# Patient Record
Sex: FEMALE | Race: WHITE
[De-identification: names, ages, dates, MRNs, and addresses within clinical notes are randomized per-mention and may not be internally consistent; named-entity substitution may affect disease eponyms.]

---

## 2018-03-19 NOTE — NUR
PATIENT HERE TODAY FOR PREADMISSION APPOINTMENT ACCOMPANIED BY HER 
DOLORES.  PATIENT IS SCHEDULED FOR A RIGHT TOTAL KNEE REPLACMENT ON 04/02/18.
SHE LIVES IN [place] AND WOULD LIKE PHYSICAL THERAPY TO BE SET UP AT [facility].  SHE REPORTS HAVING A RAMP TO GET INTO THE HOME AND NO STEPS INSIDE
THE HOME.  SHE HAS A TUB SHOWER COMBO THAT HER  HAS WORKED ON AND IT IS
A "WALK IN" SINCE HE CUT A HOLE IN THE SIDE OF THE TUB.  SHE HAS A SHOWER
CHAIR AND IS LOOKING AT GETTING A HAND HELD SHOWER HEAD.  SHE HAS A 4 WHEELED
WALKER THAT SHE WOULD LIKE TO USE IF SHE IS ABLE TO.  THIS INFORMATION WILL BE
SENT TO DR BUSTILLO OFFICE AND DE PLANNING FOR FURTHER FOLLOW UP.

## 2018-04-02 ENCOUNTER — HOSPITAL ENCOUNTER (INPATIENT)
Dept: HOSPITAL 46 - DS | Age: 78
LOS: 3 days | Discharge: HOME | DRG: 470 | End: 2018-04-05
Attending: SPECIALIST | Admitting: SPECIALIST
Payer: MEDICARE

## 2018-04-02 VITALS — HEIGHT: 64 IN | WEIGHT: 293 LBS | BODY MASS INDEX: 50.02 KG/M2

## 2018-04-02 DIAGNOSIS — M17.11: Primary | ICD-10-CM

## 2018-04-02 DIAGNOSIS — Z79.82: ICD-10-CM

## 2018-04-02 DIAGNOSIS — K21.9: ICD-10-CM

## 2018-04-02 DIAGNOSIS — I12.9: ICD-10-CM

## 2018-04-02 DIAGNOSIS — I73.9: ICD-10-CM

## 2018-04-02 DIAGNOSIS — G89.29: ICD-10-CM

## 2018-04-02 DIAGNOSIS — N18.3: ICD-10-CM

## 2018-04-02 PROCEDURE — C1776 JOINT DEVICE (IMPLANTABLE): HCPCS

## 2018-04-02 PROCEDURE — C1713 ANCHOR/SCREW BN/BN,TIS/BN: HCPCS

## 2018-04-02 PROCEDURE — G8978 MOBILITY CURRENT STATUS: HCPCS

## 2018-04-02 PROCEDURE — 0SRC0J9 REPLACEMENT OF RIGHT KNEE JOINT WITH SYNTHETIC SUBSTITUTE, CEMENTED, OPEN APPROACH: ICD-10-PCS | Performed by: SPECIALIST

## 2018-04-02 PROCEDURE — G8987 SELF CARE CURRENT STATUS: HCPCS

## 2018-04-02 PROCEDURE — G8979 MOBILITY GOAL STATUS: HCPCS

## 2018-04-02 NOTE — NUR
PT ALERT, ORIENTED AND SUPPORTED BY HER  BUCK. COUPLE TRAVELED EARLY
THIS MORNING FROM [place], SEEMED TO BE VERY THANKFUL FOR DR BUSTILLO. SPENT SOME
TIME HELPING THEM UNDERSTAND THE PROCESS FOR TODAY. PT REQUESTED PRAYER, WILL
FOLLOW AS NEEDED

## 2018-04-02 NOTE — NUR
PATIENT ARRIVED FROM PACU A+O IN PATIENT BED WITH RAINER SANCHEZ. PATIENT HAD RIGHT
TOTAL KNEE WITH  AND ARRIVES WITH STAPLES IN RIGHT KNEE FOR CLOSURE,
MEPILEX DRESSING, ACE WRAP, BILAT ANGELA HOSE, BILAT SCD'S, AND CRYO CUFF IN
PLACE. PATIENT IS ON 2L/NC AND DESATS TO THE 80'S OFF O2, BUT REMAINS IN THE
HIGH 90'S ON THE 2L/NC. 2OG IV RUNNING LR TO GRAVITY IN THE LEFT HAND. HEEL
PROTECTORS APPLIED TO BOTH FEET. LUNGS CLEAR BUT DEMINISHED. PATIENT HAS
SENSATION IN HER LEGS FROM THE SPINAL TO THE MID THIGH, BUT CAN MOVE BOTH
LOWER EXTREMETIES ON COMMAND. TOES ARE PINK AND COOL BILAT WITH CAP REFILL <3
SECONDS BILAT.

## 2018-04-02 NOTE — NUR
PATIENT RESTING COMFORTABLY IN BED, BREATHING IS EVEN AND UNLABORED. DENIES
PAIN, NO NAUSEA AT THIS TIME. NO NEEDS AT THIS TIME. CURRENTLY EATING JELLO,
O2 SATURATION % ON 2L O2 VIA NC, TITRATED TO 1L O2. WILL CONTINUE TO
MONITOR. ASSESSMENT DONE. CALL LIGHT WITHIN REACH.

## 2018-04-02 NOTE — NUR
PATIENT ASSISTED TO BEDSIDE COMODE WITH FWW. REQUIRED 3PA DUE TO PATIENT'S
WEAKNESS AND UNSTEADY GAIT. PATIENT THEN TRANSFERED BACK TO BED WITH 3PA/FWW,
NOW RESTING COMFORTABLY. PATIENT DENIES PAIN AT THIS TIME, STATES "I AM JUST
SHORT OF BREATH. I AM EXHAUSTED." APPEARS TO BE COMFORTABLY, BREATHING IS EVEN
AND NORMAL, RR IS 20. O2 SATURATION IS 95% ON 1L O2 VIA NC. PATIENT DENIES
NAUSEA AT THIS TIME. PATIENT WAS ABLE TO VOID. PATIENT DENIES FURTHER NEEDS AT
THIS TIME, ALL ORDERS IN PLACE, CALL LIGHT WITHIN REACH.

## 2018-04-02 NOTE — NUR
PATIENT ARRIVED FROM PACU AT 1255. STANDARD  DRESSING AND LOWER
EXTREMITY EQUIPMENT IN PLACE. 1310 PATIENT VOMITED AND WAS GIVEN 4MG IV
ZOFRAN. NAUSEA SEEMED TO SUBSIDE UNTIL 1355 A WHICH TIME PATIENT VOMITED AGAIN
AND WAS GIVEN 12.5MG IV PROMETHAZINE, WHICH RELIEVED THE N/V, BUT PUT THE
PATIENT TO SLEEP. WAKING PERIODICALLY UNTIL APPROXIMATELY 1630 WHEN PT GOT
PT UP
TO THE BED SIDE COMMODE. PATIIENT HAD SOME INCONTINENCE OF URINE ON THE BED
AND VOIDED SOME IN THE COMMODE. PATIENT GOT NAUSEATED AGAIN AT THIS TIME, AND
VOMITED AGAIN. PROMETHAZINE WAS DRAWN UP AGAIN, BUT NAUSEA SUBSIDED ONCE BACK
IN BED. PATIENT TRIED TO DRINK A LITTLE TEA AND EAT A LITTLE JELLO AT 1725 AND
NAUSEA RETURNED AND THE PROMETHAZINE 12.5 MG IV IN 25MLS NS DRAWN UP EARLIER
GIVEN AS BEFORE AND RELIEVING NAUSEA, BUT PUTTING PATIENT TO SLEEP AGAIN. SHE
IS RESTING QUIETLY AT THIS TIME. UNABLE TO TITRATE O2 AS OF YET.

## 2018-04-02 NOTE — NUR
SPOKE WITH PATIENT AND  IN ROOM.  PATIENT FEELING SLIGHTLY NAUSEATED
POST-OP BUT ABLE TO CONVERSE.  PATIENT LIVES IN HOME WITH RAMP, NO STAIRS
INSIDE.  HAS A MODIFIED TUB FOR LOW STEP IN.  HAS A SHOWER CHAIR AND HAND-HELD
SHOWER HEAD.  PATIENT HAS A FOUR WHEEL WALKER WITH BRAKES/SEAT IN ROOM.  SHE
IS AWARE SHE MIGHT NOT BE ABLE TO USE THAT FOR IMMEDIATE POST-OP.  SHE WANTS
TO CHECK WITH PHYSICAL THERAPY AND DR BUSTILLO.  PATIENT WANTS TO DO OUTPATIENT
THERAPY AT [facility].  SHE WILL HAVE HER FAMILY TO DRIVE HER
AND HELP HER AT HOME.  NO QUESTIONS AT THIS TIME.

## 2018-04-02 NOTE — NUR
04/02/18 1212 Pee Flowers A
BP CUFF BEING READJUSTED. PT AWAKE AND RESPONSIVE. DENIES NAUSEA OR
PAIN.

## 2018-04-02 NOTE — NUR
RECEIVED REPORT FROM RN. PATIENT IS RESTING IN BED, BREATHING IS EVEN AND
UNLABORED. O2 SATURATION % ON 2L O2 VIA NC. DENIES PAIN AT THIS TIME. NO
NEEDS. ALL ORDERS IN PLACE,  AT BEDSIDE.

## 2018-04-02 NOTE — NUR
PT MOVED FROM PACU TO M\S RM. RAINER MANDUJANO REQUESTED I NOT VISIT NOW-PT VOIDING.
HE IS GOING TO GET MORE MEDICATION. REQUESTED I COME BACK LATER. WILL CHECK
BACK LATER

## 2018-04-03 NOTE — NUR
ASSISTED PATIENT TO BEDSIDE COMODE WITH 2PA/FWW. TOLERATED WELL. NOW RESTING
COMFORTABLY IN BED, BREATHING IS EVEN AND UNLABORED. DENIES FURTHER NEEDS. AT
THIS TIME. PAIN IS 0/10. ALL ORDERS IN PLACE, CALL LIGHT WITHIN REACH.

## 2018-04-03 NOTE — NUR
PT IN ROOM WITH PATIENT.  THIS CNA SET PATIENT UP TO SIT IN CHAIR WHEN
FINISHED WITH PT. ORAL CARE SET UP FOR PATIENT. CALL LIGHT IN REACH. NO OTHER
NEEDS AT THIS TIME.

## 2018-04-03 NOTE — NUR
ASSISTED PATIENT TO BATHROOM WITH 2PA/FWW. DENIES FURTHER NEEDS. NOW RESTING
COMFORTABLY IN BED, BREATHING IS EVEN AND UNLABORED. DENIES INCREASED PAIN.
ALL ORDERS IN PLACE. CALL LIGHT WITHIN REACH.

## 2018-04-03 NOTE — NUR
PATIENT HAS BEEN RESTING OFF AND ON THROUGHOUT SHIFT. VSS, NO COMPLAINTS OF
PAIN. URINE OUTPUT HAS BEEN LOW THIS SHIFT, REQUIRED 1L BOLUS OF NS. URINE
OUTPUT CONTINUES TO BE LOW. PATIENT HAS BEEN ALERT AND ORIENTED X4, CAN BE
CONFUSED AT TIMES. REQUIRES 2L O2 WHILE SLEEPING, NO NEED FOR O2 WHILE AWAKE,
LUNG SOUNDS ARE CLEAR. BOWEL TONES ARE ACTIVE AFTER MOM, PATIENT PASSING
FLATUS. DRESSING HAS HAD SMALL AMOUNT OF SANGUINEOUS DRAINAGE FROM DRESSING TO
RIGHT KNEE. CMS INTACT. PATIENT HAS REQUIRED 2-3 PERSON ASSIST TO BEDSIDE
COMODE DUE TO WEAK/UNSTEADY GAIT. PATIENT'S GAIT HAS IMPROVED SINCE BEGINNING
OF SHIFT AND IS IMPROVING WITH TRANSFER TO BEDSIDE COMODE. PATIENT REMAINS ON
CONTINUOUS IV FLUIDS, TOLERATING A CLEAR LIQUID DIET WITHOUT ANY NAUSEA/EMESIS
THIS SHIFT.

## 2018-04-03 NOTE — NUR
ASSISTED PT TO RESTROOM. ONLY  DARK YELLOW URINE OUT. EDUCATED ON
INCENTIVE SPIROMETER AND GETTING SELF OUT OF CHAIR. SWAPPED OUT FOR LARGER
CHAIR.

## 2018-04-03 NOTE — NUR
STUDENT NURSE IN ROOM WITH PATIENT. PATIENT SITTING UP IN BEDSIDE RECLINER.
FAMILY IN ROOM. CALL LIGHT IN REACH. NO OTHER NEEDS AT THIS TIME.

## 2018-04-03 NOTE — NUR
PATIENT RESTING COMFORTABLY IN BED, BREATHING IS EVEN AND UNLABORED. FLACC
SCORE OF 0. CALL LIGHT WITHIN REACH, ALL ORDERS IN PLACE. CALL LIGHT WITHIN
REACH.

## 2018-04-03 NOTE — NUR
WALKING WITH PHYS. THER. HAD PULLED THE ENTIRE DRESSING DOWN AND BASICALLY
DESTROYED IT. REPLACED DRESSING, PT TOLERATED WELL. CONTINUING ON WITH PT.

## 2018-04-03 NOTE — NUR
REPORT RECIEVED FROM RAINER DUFF. PT AWAKE AND SITTING UP IN BED ORDERING
BREAKFAST. PT DENIES NAUSEA SINCE DAYSHIFT YESTERDAY. STUDENT RN WILL BE
PASSING MEDS, INFORMED TO HOLD BP MEDS AND DO ORTHOSTATIC BPS. ABBIE STATED
THEY WOULD LET THIS RN KNOW.

## 2018-04-03 NOTE — NUR
PATIENT RESTING COMFORTABLY IN BED, BREATHING IS EVEN AND UNLABORED. O2
SATURAITON IS 94% ON 2L O2. DENIES PAIN AT THIS TIME. CALL LIGHT WITHIN REACH,
ALL ORDERS IN PLACE.

## 2018-04-03 NOTE — NUR
PATIENT RESTING COMFORTABLY IN BED, BREATHING IS EVEN AND UNLABORED. DENIES
NEEDS AT THIS TIME. PAIN IS 0/10. CALL LIGHT WITHIN REACH, ALL ORDERS IN
PLACE.

## 2018-04-03 NOTE — NUR
Patient resting in bedside recliner. fresh ice water on bedside table. call
light in reach. no other needs at this time.

## 2018-04-03 NOTE — NUR
PT UP TO RESTROOM MULTIPLE TIMES TODAY. TOLERATES WELL. NEEDS SEVERAL PEOPLE
TO HELP STAND AND BRACE FOOT. PHYS THER X2. PAIN 2\10. OXY 10MG GIVEN. NS @
125. UO NOW QS. BP MEDS HELD THIS MORNING. FAMILY IN ROOM MOST OF DAY.

## 2018-04-03 NOTE — NUR
RECEIVED REPORT FROM RN. PATIENT RESTING IN BED, BREATHING EVEN AND UNLABORED.
DENIES NEEDS AT THIS TIME. CALL LIGHT WITHIN REACH, ALL ORDERS IN PLACE.

## 2018-04-03 NOTE — NUR
PT WALKING WITH PHYS. THER. AFTER SALINE LOCKING AND TALKING ABOUT BOOKS WITH
PT FOR AWHILE. CONINTUES TO DENY PAIN.

## 2018-04-03 NOTE — NUR
UPDATED DR. BUSTILLO REGARDING PATIENT'S LOW URINE OUTPUT. ORDERED TO HOLD
MORNING BLOOD PRESSURE MEDICATIONS AND TO DO ORTHOSTATIC VITALS IN AM.

## 2018-04-03 NOTE — NUR
PATIENT RESTING COMFORTABLY IN BED, BREATHING IS EVEN AND UNLABORED. O2
SATURUATION IS 98% ON 2L O2 WHILE AWAKE, REQUIRES 2L O2 TO MAINTAIN SATURATION
>90% WHILE ASLEEP. DENIES PAIN. ASSESSMENT DONE, SCHEDULED MEDICATIONS GIVEN.
ASSISTED TO BEDSIDE COMODE WITH 2PA/FWW. TOLERATED AMBULATION BETTER THAN
PREVIOUSLY, GAIT HAS IMPROVED. NOW RESTING IN BED AGAIN, BREATHING IS EVEN AND
UNLABORED. DENIES PAIN AFTER AMBULATION, O2 SATURATION CONTINUES TO BE >90%.
DENIES FURTHER NEEDS. CALL LIGHT WITHIN REACH, ALL ORDERS IN PLACE.

## 2018-04-03 NOTE — NUR
PATIENT STATED SHE HAD BED BATH WHILE UP IN THE BATHROOM AND GOT THE CLEAN
GOWN ON WITH ASSISTANCE FROM AN RN. FRESH ICE WATER AT BEDSIDE TABLE. FAMILLY
IN THE ROOM. CALL LIGHT IN REACH. NO OTHER NEEDS AT THIS TIME.

## 2018-04-03 NOTE — NUR
PT SITTING UP IN BED, TEXTING SOMEONE. SHE SMILED AND MENTIONED THAT SHE DID
SLEEP WELL. STILL CONFUSING ME WITH DR BUSTILLO, MUCH MORE ALERT AND ORIENTED
TODAY. PT IS VERY CHEERFUL, AND SAID SHE WANTS TO GET THIS LEG IN SHAPE SO SHE
CAN HAVE THE OTHER KNEE REPLACED IN THE FALL. EXTENDED A BLESSING, WILL FOLLOW
AS NEEDED

## 2018-04-03 NOTE — OR
[facility]
                                    [address], [place]  [address]
_________________________________________________________________________________________
                                                                 Signed   
 
 
DATE OF OPERATION:
04/02/2018
 
SURGEON:
Valdemar Torres MD
 
PREOPERATIVE DIAGNOSIS:
Degenerative joint disease severe right knee.
 
POSTOPERATIVE DIAGNOSIS:
Degenerative joint disease severe right knee.
 
PROCEDURE PERFORMED:
Right total knee arthroplasty with computer navigation.
 
ASSISTANT:
Kayce Houston PA-C.  Kayce was present in critical positioning,
retraction, and wound closure. 
 
ANESTHESIA:
Spinal.
 
BLOOD LOSS:
Minimal.
 
TOURNIQUET TIME:
67 minutes.
 
IMPLANTS:
Jewel size #4 femur, #3 tibia with 13 mm insert and a 32 mm patella.
 
BRIEF HISTORY:
Leida is a 77-year-old female with severe erosive osteoarthritis of the knee.  Risks and
benefits of operative treatment were discussed with her.  She elected to proceed.  Once
medical clearance was obtained, she was taken to the operating room.  After adequate
anesthesia, she was placed on operating room table.  All downside pressure points well
padded.  The right leg was placed in well-padded proximal thigh tourniquet and prepped
and draped in a standard sterile fashion.  The leg was exsanguinated using Esmarch
bandage.  Tourniquet inflated to 300 mmHg.  Standard anterior approach through curved
incision was taken through the skin and the subcutaneous tissue.  The median
parapatellar arthrotomy was performed.  There was extensive fluid in the knee.  There
was a loose body right in the front of the knee that measured about 5 x 9 mm.  The MCL
 
    Electronically Signed By: VALDEMAR TORRES MD  04/03/18 0756
_________________________________________________________________________________________
PATIENT NAME:     CHICO BLUM                 
MEDICAL RECORD #: [id number]            OPERATIVE REPORT              
          ACCT #: [id number]  
DATE OF BIRTH:   [birth year]            REPORT #: [id number]      
PHYSICIAN:        VALDEMAR TORRES MD              
PCP:              NOEMY BARRERA MD         
REPORT IS CONFIDENTIAL AND NOT TO BE RELEASED WITHOUT AUTHORIZATION
 
 
                                  [facility]
                                    [address], [place]  [address]
_________________________________________________________________________________________
                                                                 Signed   
 
 
was of a sleeve around the posterior medial corner.  The fat pad was excised.  The knee
was flexed and the navigation guide was pinned to the distal femur and the femur was
registered with the computer.  The cutting block was then pinned to the distal femur and
placed in neutral alignment.  The distal femoral cut was made.  The bone was excised.
Distal femur sized to a #4 and the AP cutting block was pinned in line with the
epicondylar axis.  The anterior, posterior, and chamfer cuts were made.  The osteophytes
were removed as we went.  The attention was turned to the proximal tibia.  The tibial
navigation guide was pinned, and the tibia and malleolar are registered with the
computer.  The cutting block was then pinned in neutral alignment and the cut was made
with care taken to protect the patellar tendon and MCL.  The bone was removed as were
any meniscal remnants.  Posterior osteophytes were removed off the femur and the
posterior release performed.  The flexion and extension gaps were sized and found to be
symmetric at 13 mm.  The trials were then positioned.  The knee taken through range of
motion and found to be very stable.  She ranged from 0 to about 100 degrees, which
represented thigh-heel impingement.  The patella was cut, sized, and drilled for a 32 mm
patella.  The trials were removed.  The proximal tibia was finished using the keel punch
and the distal femur was drilled.  The bone surfaces were pulse lavaged, packed with dry
Ray-Anahi.  Cement was mixed and reached proper consistency, and it was placed in all
implants on bone surfaces.  Tibia was impacted in position.  First, all excess cement
was removed.  The polyethylene was snapped into position and the femur was impacted and
again any remaining cement was removed.  The knee was extended and nicely loaded.  The
patella was clamped and remaining cement was removed.  The cement was allowed to harden.
 Once it hardened sufficiently, the knee was flexed and the remaining excess was removed
using osteotomes.  The knee was pulse lavaged at intervals throughout the procedure.  A
total of 3 L of saline was used.  We did not use antibiotic irrigation due to her
allergy to neomycin.  The periarticular soft tissues were injected with 100 mL of
ropivacaine and Toradol mixture.  The arthrotomy was then closed using #2 Stratafix, #0
Stratafix of the subcutaneous tissue, and staples for the skin.  The wound was dressed
with Mepilex Ag dressing, ABD and Ace wrap.  She tolerated the procedure well.  All
sponge, needle, and instrument counts were correct. 
 
 
 
            ________________________________________
            Valdemar Torres MD 
 
 
BA/MODL
Job #:  [id number]
DD:  04/02/2018 12:10:37
DT:  04/02/2018 16:40:32
 
 
    Electronically Signed By: VALDEMAR TORRES MD  04/03/18 0756
_________________________________________________________________________________________
PATIENT NAME:     CHICO BLUM                 
MEDICAL RECORD #: [id number]            OPERATIVE REPORT              
          ACCT #: [id number]  
DATE OF BIRTH:   [birth year]            REPORT #: [id number]      
PHYSICIAN:        VALDEMAR TORRES MD              
PCP:              NOEMY BARRERA MD         
REPORT IS CONFIDENTIAL AND NOT TO BE RELEASED WITHOUT AUTHORIZATION
 
 
                                  [place] [id number]
_________________________________________________________________________________________
                                                                 Signed   
 
 
Copies:                                
~
 
 
 
 
 
 
 
 
 
 
 
 
 
 
 
 
 
 
 
 
 
 
 
 
 
 
 
 
 
 
 
 
 
 
 
 
 
 
 
 
 
    Electronically Signed By: VALDEMAR TORRES MD  04/03/18 0756
_________________________________________________________________________________________
PATIENT NAME:     CHICO BLUM                 
MEDICAL RECORD #: [id number]            OPERATIVE REPORT              
          ACCT #: [id number]  
DATE OF BIRTH:   [birth year]            REPORT #: [id number]      
PHYSICIAN:        VALDEMAR TORRES MD              
PCP:              NOEMY BARRERA MD         
REPORT IS CONFIDENTIAL AND NOT TO BE RELEASED WITHOUT AUTHORIZATION

## 2018-04-04 NOTE — NUR
PT BACK IN BED, HAD GOTTEN UP TO USE THE COMMODE.  ASSIST OF 2 TO GET UP AND
BACK INTO BED, POSSIBLE TO USE ONE STAFF BUT PT FEELS COMFORTABLE WITH 2
STAFF.  CRYOCUFF IN PLACE, ANGELA HOSANGY EDGE, HEEL PROTECTORS MINESH, SCD'S IN PLACE.
PT STATED THAT WHEN GETTING UP THE PAIN IN R KNEE INCREASES ALOT.. NOT TO
CRYING BUT CLOSE, ONCE SHE IS UP AND WALKING THE PAIN SUBSIDES, AS DOES THE
PAIN WHEN SHE GETS BACK INTO BED.  REQUESTED PAIN MED TO KEEP PAIN UNDER
CONTROL.  EDUCATED PT OF SIDE EFFECTS OF PAIN MED, IE CONSTIPATION.  SHE
STATED AWARENESS.  O2 IN PLACE, CALL LIGHT WITHIN REACH.  NO OTHER NEEDS AT
THIS TIME.

## 2018-04-04 NOTE — NUR
PT UP TO BATHROOM FOR BM. 2 PERSON ASSIST WITH FWW. TOLERATED WELL. KNEE PAIN
WELL CONTROLLED WITH OXY. STATES SHE HAS ALL OVER ACHES AND SORENESS. 
IN ROOM, CYRO IN PLACE.

## 2018-04-04 NOTE — NUR
ASSISTED PATIENT TO TRY TO STAND FOR AMBULATION. PATIENT UNABLE TO STAND
WITHOUT TWO PERSON ASSIST AND GAIT BELT. PATIENT AMBULATED HALF LAP AROUND
MEDSURGE FLOOR WITH ASSISTANCE FROM RN. PATIENT BACK TO BEDSIDE RECLINER. CRYO
CUFF ON. CALL LIGHT IN REACH. NO OTHER NEEDS AT THIS TIME.

## 2018-04-04 NOTE — NUR
PATIENT SITTING UP IN BEDSIDE RECLINER. FAMILY IN THE ROOM. PATIENT STATED SHE
HAD ALREADY HAD A BED BATH AND CHANGE OF GOWNS WHILE SHE WAS UP GOING TO THE
BATHROOM. FRESH ICE WATER ON BEDSIDE TABLE. CALL LIGHT IN REACH. NO OTHER
NEEDS AT THIS TIME.

## 2018-04-04 NOTE — NUR
PATIENT ASSISTED TO BATHROOM WITH 2PA/FWW. TOLERATING AMBULATION WELL,
REQUIRES MINIMAL ASSISTANCE. NOW RESTING IN BED, BREATHING IS EVEN AND
UNLABORED. STATES "THE PAIN IS UP THERE." PATIENT UNABLE TO GIVE PAIN NUMBER
ON 1-10 SCALE, FLACC SCORE OF 4 NOTED. PRN OXYCODONE GIVEN. PATIENT DENIES
FURTHER NEEDS. CALL LIGHT WITHIN REACH, ALL ORDERS IN PLACE.

## 2018-04-04 NOTE — NUR
PATIENT CALLED RN AND STATES "I AM WORRIED THAT I AM GOING BACKWARDS INSTEAD
OF FORWARD." PROVIDED PATIENT REASSURANCE THAT SHE IS PROGRESSING
APPROPRIATELY AND THAT PHYSICAL THERAPY WILL WORK WITH HER IN AM. PATIENT
STATES THAT SHE IS HAVING PAIN IN LOWER BACK AND HAS GENERAL MUSCLE ACHES
THROUGHOUT BODY. DENIES PAIN IN KNEE AT THIS TIME. REASSURED PATIENT THAT SHE
IS DOING WELL. PATIENT STATES THAT SHE UNDERSTANDS AND DENIES FURHTER NEEDS.
CALL LIGHT WITHIN REACH.

## 2018-04-04 NOTE — NUR
PATIENT LIFTED HERSELF TO STAND ON HER OWN WITH STAND BY ASSISTANCE AND FRONT
WHEEL WALKER.
.
PATIENT
UP TO BATHROOM AND BACK TO BEDSIDE RECLINER. CRYO CUFF ON. RN IN ROOM. CALL
LIGHT IN REACH. NO OTHER NEEDS AT THIS TIME.

## 2018-04-04 NOTE — NUR
PT FEELING BETTER THIS EVENING AFTER A ROUGH DAY. PAIN IS WELL CONTROLLED WITH
10MG OXY. GIVEN TORADOL ONCE TODAY PER DR BUSTILLO AND PT REPORTS FEELING MUCH
BETTER. UO QS. LUNGS CLEAR. DR RESTARTED BP MED FOR TONIGHT. DRESSING
UNCHANGED.

## 2018-04-04 NOTE — NUR
medicated with scheduled tylenol 1000mg po 3/10 r leg pain, toradol 15mg iv
4/10 chronic back pain, in bed, watching tv, cryocuff to r knee, dressing
Mepilex  and ace wrap dressing changed,lisa hose, scds and heel protectors
bilat in place. Tolerated going chair to brp and back to bed with 2 assist and
fww, well

## 2018-04-04 NOTE — NUR
THIS RN ASKED TO CHECK ON PTS PAIN. THIS RN TO ROOM, PT REPORTS 2/10 PAIN AND
STATES "I'D BE READY FOR A PAIN PILL." SEE MAR FOR MEDCATION GIVEN. PT
REPOSITIONED AND WARM BLANKETS PROVIDED FOR BACK. PT STATES "OH THAT'S
BETTER." PT WATCHING TV. [place] WITHIN REACH. PT STATES SHE HAS NO
ADDITIONAL REQUESTS OR COMPLAINTS AT THIS TIME.

## 2018-04-04 NOTE — NUR
PATIENT'S NIGHT WAS UNEVENTFUL. SHE HAS BEEN RESTING THROUGHOUT SHIFT. VSS,
URINE OUTPUT QS, PAIN WELL CONTROLLED. HAS BEEN UP TO VOID SEVERAL TIMES THIS
SHIFT, TAKING PO WELL WITHOUT NAUSEA. DRESSING DRY AND INTACT, SMALL AMOUNT OF
SANGUINEOUS DRAINAGE NOTED. CMS INTACT, AMBULATING WELL, REQUIRING SMALL
AMOUNTS OF ASSISTANCE, GAIT IS STEADY, PATIENT DOES NOT REPORT
LIGHT-HEADEDNESS WITH AMBULATION. BOWEL TONES ARE ACTIVE, RECEIVED MOM THIS
SHIFT, NO BOWEL MOVEMENT SINCE AFTER SURGERY. NO ACUTE CHANGES FROM BEGINNING
OF SHIFT.

## 2018-04-04 NOTE — NUR
PT CALLED TO SAY SHE WAS NAUSOUS AFTER LYING DOWN IN BED. PT SHAKING AND
VISIBLY UPSET. ADMINISTERED TORADOL, ZOFRAN, TYLENOL AND GABAPENTIN AS
ORDERED. ALSO GAVE HER A SNACK. STAYED IN ROOM TALKING TO PT AND REASSURING
THAT SHE IS DOING WELL AND IS PROGRESSING NICELY. PT APPEARS TO HAVE CALMED
DOWN AFTER UP TO RESTROOM, REPOSITIONED IN CHAIR AND WARM BLANKET PLACED.
 ON COUCH. ADVISED PT TO TRY AND RELAX AND MAYBE CLOSE EYES FOR A BIT
BEFORE PHYS. THER.

## 2018-04-04 NOTE — NUR
PATIENT JUST FINISHED WITH PT. PATIENT SITTING IN BEDSIDE RECLINER WITH FEET
UP.
THIS CNA ADJUSTED CRYO CUFF FOR PATIENT. FAMILY
IN THE ROOM. CALL LIGHT IN REACH. NO OTHER NEEDS AT THIS TIME.

## 2018-04-04 NOTE — NUR
DONYA NURSE ROUNDING NOTE:, AWAKE, IN CHAIR, LEGS ELEVATED, CRYOCUFF IN PLACE,
C/O MILD R LEG DISCOMFORT. WAS MEDICATED AT 1756 WITH FAIR PAIN RELIEF.
FAMILY AT BEDSIDE

## 2018-04-04 NOTE — NUR
REPORT RECIEVED FROM RAINER DUFF. PT SITTING UP IN CHAIR AND IS FAIRLY TEARFUL.
STATES SHE ACHES AND IS SORE ALL OVER. OMEGA GAVE HER 10MG OF OXY.  IN
ROOM. STATES THAT HER KNEE IN PARTICULAR IS NOT HURTING THAT MUCH.

## 2018-04-04 NOTE — NUR
PT UP WALKING IN HUBER WITH MARILUZ PT. PT RATES PAIN 2-3\10 AND STATES SHE CAN
FEEL IT. APPEARS TO BE WALKING WELL AND HAS A GOOD PACE.

## 2018-04-04 NOTE — NUR
ASSISTED PATIENT TO COMODE IN BATHROOM WITH 2PA, GAIT IS STEADY, HAD A MORE
DIFFICULT TIME GETTING OFF OF COMODE THIS MORNING, REQUIRING ASSISTANCE WITH
GETTING UP. NOW RESTING IN CHAIR, BREATHING IS EVEN AND UNLABORED. REPORTS
4/10 PAIN IN RIGHT KNEE, SCHEDULED TYLENOL GIVEN PER EMAR. PATIENT DENIES
FURTHER NEEDS. CALL LIGHT WITHIN REACH.

## 2018-04-04 NOTE — NUR
PT SEEMS MORE ALERT AND ORIENTED TODAY. HER  BUCK BY HER SIDE. DR BUSTILLO
WANTED ME TO TRY AND MOTIVATE PT, SHE NEEDS TO DO MORE AND PT SEEMS SOMEWHAT
DISCOURAGED. HAD I FEEL A POSITIVE VISIT WITH PT-SHE ACKNOWLEDGED THAT SHE
NEEDS TO DO MORE. BUCK SEEMS AWARE OF HER NEEDS, ESPECIALLY HOW SHE HAS LET
HER FEELINGS TO TAKE OVER FROM WHAT REALLY IS HAPPENING. HAD PRAYER WITH PT,
WILL FOLLOW AS NEEDED

## 2018-04-04 NOTE — NUR
patient assisted to bathroom with 2pa/fww. tolerating ambulation well, states
that pain is at 3 or 4 out of 10 with ambulation. now resting in bed again,
breathing is even and unlabored. reports 1/10 pain at rest. patient states "it
is more uncomfortable than anything." denies further needs. call light within
reach, all orders in place.

## 2018-04-04 NOTE — NUR
PATIENT IS SITTING UP IN CHAIR WATCHING TV. PATIENTS  IN ROOM. ICE IN
CRYO. FRESH ICE WATER. STUDENT NURSE IN ROOM. CALL LIGHT WITHIN REACH. NO
OTHER NEEDS AT THIS TIME.

## 2018-04-04 NOTE — NUR
PATIENT REPORTS 5/10 PAIN "ALL OVER," PRN OXYCODONE GIVEN PER EMAR. PATIENT
STATES THAT SHE IS FEELING ANXIOUS THIS MORNING. PROVIDED REASSURANCE AGAIN
FOR PATIENT. PATIENT IS FIDGITING IN CHAIR,  AT BEDSIDE.

## 2018-04-05 NOTE — NUR
Up to brp with 2 person assist adn fww. voided and had soft bm, back to bed,
tolerated well. Medicated with 2- 5mg po Oxycodone tabs 2/10 r leg pain

## 2018-04-05 NOTE — NUR
PT EXCITED BUT NERVOUS ALL AT THE SAME TIME ABOUT GOING HM. DR BUSTILLO IN AGAIN
AND REMINDED HER TO STAY DIIGENT WITH REHAB. PT ACKNOWLEDGED HER NEED TO KEEP
WORKING; EVEN WHEN SHE DOESN'T FEEL LIKE IT. GAVE SOME WORDS OF
ENCOURAGEMENT.EXTENDED A BLESSING, WILL FOLLOW AS NEEDED

## 2018-04-05 NOTE — NUR
SHIFT REPORT RECEIVED FROM [place]. PATIENT UP TO CHAIR EATING BREAKFAST.
REPORTS MILD PAIN AT THE RIGHT KNEE. MEPILEX DRESSING ON RIGHT KNEE WNL. CALL
LIGHT IN REACH. FAMILY AT BEDSIDE.

## 2018-04-05 NOTE — NUR
UP TO BRP, WITH FWW AND ONE ASSIST, VOIDED, BACK TO BED, TOLERATED WELL,
MEDICATED WITH SCHEDULED TYLENOL 1000MG PO

## 2018-04-05 NOTE — NUR
PATIENT RESTING IN THE CHAIR ,  IN ROOM. PATIENT DENIES NAUSEA, REPORT
MILD PAIN IN THE RIGHT KNEE. RATED 2/10. PATIENT WAS MEDICATED PRIOR TO
PHYSICAL THERAPY. DRESSING ON RIGHT KNEE INTACT WITH SCANT AMOUNT OF DRAINAGE.
PERIPHERAL PULSE PALPABLE. ANGELA HOSE ON. TANA IS IN ROOM AT THIS TIME.

## 2018-04-05 NOTE — NUR
PT CALL LIGHT ON. PT REQUESTS ASSISTANCE UP TO RESTROOM. 2 PERSON STAND BY
ASSIST WITH FWW UP TO RESTROOM. PT HAS LOOSE, BROWN, BOWEL MOVEMENT. AND IS
ASSISTED BACK TO CHAIR. CRYO CUFF REPLACED, ICE REFILLED. PT VISITING WITH
PHARMACIST. NO REQUESTS OR COMPLAINTS AT THIS TIME.

## 2018-04-05 NOTE — NUR
PT HAS SLEPT WELL THIS SHIFT. HAS BEEN MEDICATED WITH OXYCODONE 10MG PO PER
C/O R KNEE, GOOD CMS, STAPLES INTACT, INCISION WITH SCANT AMOUNT OFO SEROUS
DRAINAGE AT KNEE AREA. MEPILEX DRESSING REMOVED AS IT HAS OLD DRAINAGE AS PER
PTS REQUESTS, ACE WRAP CHANGD TOO. ANGELA HOSE, SCDS, HEEL PROTECTORS BILAT IN
PLACE, CRYOCUFF OVER R K . PT USES 2 PERSON ASSIST AND FWW, HAS BEEN UP IN
CHAIR AND BACK TO BED, VOIDED AND HAD A BM. TOLERATED WELL. CURRENTLY
SLEEPING, MEDS EFFECTIVE, NO FURTHER C/O PAIN OR REQUESTS

## 2018-04-05 NOTE — NUR
FAXED CHART NOTES TO [facility] OP PT INCLUDING FACESHEET, H AND P, OP NOTES, PROG
NOTES, DC PACKET, PT EVAL AND NOTES.  RECIEVED FAX CONFIRMATION ON THIS.  ALSO
CALLED AND SPOKE WITH BALDEMAR AT [facility], SHE STATED SHE HAS RECIEVED THESE
AND THAT THEY WILL NOTIFY THE PT ABOUT WHEN HER APPT IS.

## 2018-04-09 NOTE — DS
[facility]
                                    [address], [place]  [address]
_________________________________________________________________________________________
                                                                 Signed   
 
 
ADMISSION DATE:  04/02/2018
 
DISCHARGE DATE:  04/05/2018
 
ADMISSION DIAGNOSIS:
DJD right knee.
 
DISCHARGE DIAGNOSIS:
DJD right knee.
 
PROCEDURE PERFORMED:
Right total knee arthroplasty.
 
BRIEF HISTORY:
Ynes is a 77-year-old female with worsening pain in her knee.  She had a non-operative
treatment for a number of years and had worsening pain despite this.  Her function was
also decreased.  Risks, benefits, and alternatives of operative were discussed with her
and she elected to proceed. 
 
DESCRIPTION OF PROCEDURE:
Once consent was obtained, she was taken to the operating room.  After adequate
anesthesia, she underwent the above-named procedure.  She tolerated this well.  She was
taken to the recovery room and subsequently to the orthopedic floor.  She was initially
placed on pain medication of oxycodone 5 to 10 mg p.o. q.3 hours p.r.n. and gabapentin.
She was given one dose of dexamethasone on postoperative #day 1.  She managed her pain
quite well with this program.  She was also placed on Toradol for general body aches.
She was kept on DVT prophylaxis of SCDs, TEDs, and Xarelto 10 mg p.o. daily.  She did
well with physical therapy.  She was able to ambulate up and down stairs and down the
hallway by the day of discharge.  She will be discharged to home with outpatient
physical therapy __________.  She will follow up with me in 10-14 days or sooner should
she have problems. 
 
 
 
            ________________________________________
            MD JANE Billings/GLEN
Job #:  [id number]
DD:  04/05/2018 07:26:06
DT:  04/05/2018 08:59:07
 
    Electronically Signed By: VALDEMAR BUSTILLO MD  04/09/18 0814
_________________________________________________________________________________________
PATIENT NAME:     YNES BLUM                 
MEDICAL RECORD #: [id number]            DISCHARGE SUMMARY             
          ACCT #: [id number]  
DATE OF BIRTH:   [birth year]            REPORT #: [id number]      
PHYSICIAN:        VALDEMAR BUSTILLO MD              
PCP:              NOEMY BARRERA MD         
REPORT IS CONFIDENTIAL AND NOT TO BE RELEASED WITHOUT AUTHORIZATION
 
 
                                  [address]  [address]
_________________________________________________________________________________________
                                                                 Signed   
 
 
Copies:                                
~
 
 
 
 
 
 
 
 
 
 
 
 
 
 
 
 
 
 
 
 
 
 
 
 
 
 
 
 
 
 
 
 
 
 
 
 
 
 
 
 
 
    Electronically Signed By: VALDEMAR BUSTILLO MD  04/09/18 0814
_________________________________________________________________________________________
PATIENT NAME:     YNES BLUM                 
MEDICAL RECORD #: [id number]            DISCHARGE SUMMARY             
          ACCT #: [id number]  
DATE OF BIRTH:   [birth year]            REPORT #: [id number]      
PHYSICIAN:        VALDEMAR BUSTILLO MD              
PCP:              NOEMY BARRERA MD         
REPORT IS CONFIDENTIAL AND NOT TO BE RELEASED WITHOUT AUTHORIZATION

## 2018-04-17 ENCOUNTER — HOSPITAL ENCOUNTER (INPATIENT)
Dept: HOSPITAL 46 - MS | Age: 78
LOS: 4 days | Discharge: NURSING, REHAB OR LONG-TERM CARE | DRG: 467 | End: 2018-04-21
Attending: SPECIALIST | Admitting: SPECIALIST
Payer: MEDICARE

## 2018-04-17 VITALS — WEIGHT: 293 LBS | BODY MASS INDEX: 50.02 KG/M2 | HEIGHT: 64 IN

## 2018-04-17 DIAGNOSIS — Z88.3: ICD-10-CM

## 2018-04-17 DIAGNOSIS — D64.9: ICD-10-CM

## 2018-04-17 DIAGNOSIS — Z79.891: ICD-10-CM

## 2018-04-17 DIAGNOSIS — Z88.0: ICD-10-CM

## 2018-04-17 DIAGNOSIS — Z79.1: ICD-10-CM

## 2018-04-17 DIAGNOSIS — T84.53XA: Primary | ICD-10-CM

## 2018-04-17 DIAGNOSIS — G62.9: ICD-10-CM

## 2018-04-17 DIAGNOSIS — Z79.899: ICD-10-CM

## 2018-04-17 DIAGNOSIS — K21.9: ICD-10-CM

## 2018-04-17 DIAGNOSIS — N18.3: ICD-10-CM

## 2018-04-17 DIAGNOSIS — E66.01: ICD-10-CM

## 2018-04-17 DIAGNOSIS — M79.7: ICD-10-CM

## 2018-04-17 DIAGNOSIS — Z79.82: ICD-10-CM

## 2018-04-17 DIAGNOSIS — Z88.8: ICD-10-CM

## 2018-04-17 DIAGNOSIS — E87.70: ICD-10-CM

## 2018-04-17 DIAGNOSIS — I12.9: ICD-10-CM

## 2018-04-17 DIAGNOSIS — R25.3: ICD-10-CM

## 2018-04-17 DIAGNOSIS — E87.1: ICD-10-CM

## 2018-04-17 PROCEDURE — C1751 CATH, INF, PER/CENT/MIDLINE: HCPCS

## 2018-04-17 PROCEDURE — C1776 JOINT DEVICE (IMPLANTABLE): HCPCS

## 2018-04-17 PROCEDURE — 0SPV0JZ REMOVAL OF SYNTHETIC SUBSTITUTE FROM RIGHT KNEE JOINT, TIBIAL SURFACE, OPEN APPROACH: ICD-10-PCS | Performed by: SPECIALIST

## 2018-04-17 PROCEDURE — 05HY33Z INSERTION OF INFUSION DEVICE INTO UPPER VEIN, PERCUTANEOUS APPROACH: ICD-10-PCS | Performed by: SPECIALIST

## 2018-04-17 PROCEDURE — 0SRV0JZ REPLACEMENT OF RIGHT KNEE JOINT, TIBIAL SURFACE WITH SYNTHETIC SUBSTITUTE, OPEN APPROACH: ICD-10-PCS | Performed by: SPECIALIST

## 2018-04-17 NOTE — NUR
PT EATING SANDWICH, DR BELLE IN PRIOR TO.  MED PT FOR PAIN 3/10 STATES THAT
THE PAIN COMES ON SUDDENLY THEN GOES AWAY BUT DOESN'T WANT IT TO GET WORSE.

## 2018-04-17 NOTE — NUR
PICC ADJUSTMENT.
PRIOR TO RADIOLOGY READ, IT WAS NOTED THAT THE LINE TAKES A SHARP ANGLE DEEP
IN THE SVC. THE LINE WAS WITHDRAWN 5 CM AND A SECOND CXR WAS TAKEN. WE WILL
AWAIT THIS READ PRIOR TO USING THE LINE.

## 2018-04-17 NOTE — NUR
PT VISITING AND SUDDENLY VOMITED.  DENIES NAUSEA, NOTED SHE STARTED BURPING
PRIOR AND SAID I HOPE I DON'T THROW UP AS WHEN I BURP I MIGHT VOMIT.  500 CC
OF UNDIGESTED SANDWICH, THE DECRADON SHE HAD JUST RECEIVED PO.  IV ANTIBIOTIC
CURRENLTY INFUSING.  STATES WHEN SHE TAKES PILLS SHE STARTS BURPING ALOT, BUT
AT HOME SHE DOESN'T VOMIT.  DENIES NAUSEA.  STATES SHE HAS INCREASED IN REFLUX
IN THE PAST 2 WEEKS.  RIGHT FOOT REMAINS PINK, WARM WITH EDEMA.  ABLE TO
WIGGLE TOES ON RIGHT FOOT.

## 2018-04-17 NOTE — NUR
PULSE OX IN PLACE, PT FELL ASLEEP, O2 DROPPED TO 86-87.  1.5 L O2 APPLIED PER
NC.  NO FURTHER VOMITING.

## 2018-04-17 NOTE — NUR
04/17/18 1857 Ariella Boogie
1835 PT ARRIVED TALKING WITH CRNA AT BEDSIDE. PT REPORTS FEELING
"WRIED" BUT DENIES PAIN AND NAUSEA.
1840 VSS.
1856 WARM BLACKETS APPLIED. PT ENCOURAGED TO COUGH AND DEEP BREATH.

## 2018-04-17 NOTE — NUR
PT ARRIVED TO ROOM FROM PACU.  IS ALERT, ORIENTATED.  RIGHT LEG WITH WRAPPING
FROM ABOVE THE KNEE TO ANKLE.  RIGHT ANKLE WITH PITTING EDEMA, FOOT IS WARM
AND PINK. PT COMPLAIN OF BURNING ALONG THE OUTER RIGHT SHIN AREA BELOW THE
KNEE.  NO NAUSEA POST SURGERY.  SCD, ANGELA IN PLACE LEFT LEG.  WILL CHECK ORDERS
AND FOLLOW PER MD.

## 2018-04-17 NOTE — NUR
DIRECT ADMIT FROM HEPPNER. CELLULITIS RLE. 2 WK POST OP RIGHT KNEE SURGERY.
NPO FOR PROCEDURE. IVF @ 125. 2PA WITH FWW. BRUISING ON DONNA, LEFT ANKLE, AND
LOWER BACK. SCD AND ANGELA HOSE ON LEFT LEG. DEBBIE PERIPHERAL IV THAT PATIENT
ARRIVED WITH. PICC PLACED IN DEBBIE THIS AFTERNOON. LEFT FLOOR AT 1615 FOR I&D OF
RIGHT KNEE. SOME DRAINAGE COMING FROM RLE.

## 2018-04-17 NOTE — NUR
WITH 3 STAFF WE ATTEMPTED TO GET PT UP TO COMMODE BUT SHE WAS NOT ABLE TO
STAND. PT IS BACK IN BED WITH CHUX PADS BENEATH HER.

## 2018-04-17 NOTE — NUR
GAVE PT ZOFRAN AFTER 500MLS OF EMESIS EARLIER. PT DENIES FURTHER NEEDS AT THIS
TIME. CALL LIGHT IS WITHIN REACH AND  IS IN HER ROOMM.

## 2018-04-17 NOTE — NUR
CONTACTED DR BUSTILLO TO GET A DIET ORDER, RECEIVED ORDERS OF GABAPENTIN,
DECADRON 2 DOSES, ONE TONIGHT AND ONE TOMORROW.

## 2018-04-17 NOTE — NUR
TRANSFERRED PATIENT FROM BED TO BSC AND THEN TO RECLINER. LEGS ELEVATED. CALL
LIGHT WITHIN REACH. LR AND TUBING READY TO BE ATTACHED TO PATIENT BEFORE
OPERATING ROOM STAFF TAKE PATIENT.

## 2018-04-18 NOTE — NUR
PATIENT SITTING UP IN BED. FAMILY IN ROOM VISITING. CALL LIGHT IN REACH. NO
OTHER NEEDS AT THIS TIME.

## 2018-04-18 NOTE — NUR
ASSISTED PT UP FROM COMMODE TO BED. HAD A HARD TIME GETTING UP AND STAYING UP.
AFTER A FEW TRIES AND REPOSITIONING OF FEET SHE WAS ABLE TO WALK A FEW FEET.
ADMINISTERED PAIN MEDICATIONS. PT SITTING ON SIDE OF BED WAITING FOR MARILUZ
PHYS. THER.

## 2018-04-18 NOTE — NUR
PATIENT UP WITH ONE RN, 2 STUDENT NURSES, AND THIS CNA. PATIENT HAD 200ML OF
EMISES WHEN SHE SAT STRAIGHT UP IN BED. PATIENT USED GATE BELT
AND FWW FROM BED TO BSC TO CHAIR. BED BATH DONE. LINENS CHANGED. ORAL CARE
DONE. PATIENT UP IN CHAIR WITH LEGS ELEVATED EATING BREAKFAST.  IN
ROOM. CALL BUTTON IN REACH. NO OTHER NEEDS AT THIS TIME. FRESH ICE WATER
GIVEN.

## 2018-04-18 NOTE — NUR
THIS CNA AND CNA RERE ASSISTED PATIENT ONTO THE BEDSIDE COMMODE. 2 PERSON
WITH FWW. PATIENT NOW SITTING UP IN BED EATING DINNER. CALL LIGHT WITHIN
REACH. NO OTHER NEEDS AT THIS TIME.

## 2018-04-18 NOTE — NUR
PT HAD BOUT OF EMESIS AS ABOUT TO GET OUT OF BED. ADMINISTERED ZOFRAN. PT
WIPED DOWN AND REPLACED GOWN.

## 2018-04-18 NOTE — NUR
ADMINISTERED DECADRON AFTER PHARM BROUGHT IT UP. PASTOR HUNTER IN PRAYING WITH
PT. SHE APPEARS EMOTIONAL.  IN ROOM.

## 2018-04-18 NOTE — NUR
PATIENT IS RESTING IN BED WITH EYES CLOSED. BREATHING IS EVEN AND UNLABORED,
SNORING IS HEARD. RR 16. TEDHOSE, AND SCD ON RLE. CALL LIGHT IN REACH.

## 2018-04-18 NOTE — NUR
CALLED DR BELLE TO LET HIM KNOW THE PT HAS NOT VOIDED THROUGH THE NIGHT BUT
 OF EMESIS AND THAT BP IS NORMAL. ALSO NOTIFIED HIM OF BLADDER SCAN OF
97 MLS. HE SAID TO CONTINUE TO MONITOR AND CONTINUE THE IV FLUIDS AS ORDERED.
WILL CONTINUE TO MONITOR.

## 2018-04-18 NOTE — NUR
RECEIVED REPORT FROM DAY SHIFT RN. PATIENT ASSISTED FROM THE [facility] BACK TO THE
HOSPITAL BED. PATIENT IS A 2PA W/GAIT BELT AND FWW. PATIENT IS NOW BACK IN BED
RESTING. PATIENT HAS CAST PADDING ON RIGHT LEG AND IT IS C/D/I. PATIENT HAS
TEDHOSE AND SCDS ON LEFT LEG. PATIENT RATES PAIN AT A 1/10 IN BED AND 3/10
WITH MOVEMENT. PATIENT IS REQUESTING PAIN MEDICATION WITH NIGHT MEDICATIONS.
NO FURTHER NEEDS NOTED. CALL LIGHT IN REACH. [id number].

## 2018-04-18 NOTE — NUR
PT ATTEMPTED TO VOID ON BEDPAN. WE WERE ONLY ABLE TO OBTAIN 10 MLS OF DARK
YELLOW URINE. PT STATES HER PAIN IS A 5/10 WHEN MOVING, ADMINISTERED 5MG
OXYCODONE AND TYLENOL. CALL LIGHT IS WITHIN REACH.

## 2018-04-18 NOTE — NUR
PATIENT IV ABX COMPLETED. PATIENTS PICC IS NOW HEPLOCKED. PATIENT DID NOT
AWAKEN WHILE IN THE ROOM. SCDS, AND TEDHOSE REMAIN IN USE ON RLE. RR 18. CALL
LIGHT IN REACH.

## 2018-04-18 NOTE — NUR
PATIENT ASSESMENT COMPLETED. PATIENTS EVENING MEDICATIONS GIVEN PER ORDER.
PATIENT CONTINUEST TO RATE PAIN AT A 1/10 WHILE AT REST AND 3/10 W/MOVMEMENT.
PATIENT GIVEN PRN PAIN MEDICATION PER REQUEST. PATIENT ASSISTED TO THE [facility] AS
A 2PA W/FWW AND GAIT BELT. PATIENT WAS ABLE TO VOID. PATIENT IS NOW BACK IN
BED RESTING. PATIENT HAS TEDHOSE ON RLE AND SCDS. PATIENT CONTINUES TO REFUSE
HEEL PROTECTORS. PATIENT EDUCATED ON THE IMPORTANCE OF HEEL PROTECTORS.
PATIENT CONTINUES TO REFUES. WILL CONTINUE TO ENCOURAGE HEEL PROTECTORS.
PATIENT HAS A PICC LINE UN HER RIGHT UPPER ARM THAT IS INFUSING ABX AT THIS
TIME. PATIENT COMPLETED IS AND ACAPELLA. PATIENT IS ON RA. PATIENT HAS CAST
PADDING ON RIGHT LEG IT IS C/D/I. PATEINT PROVIDED CRACKERS WITH PO
MEDICATIONS. PATIENT DENIES ANY NAUSEA. PATIENT DENIES ANY FURTHER NEEDS. CALL
LIGHT IN REACH.  AT THE BEDSIDE. AAOX3

## 2018-04-18 NOTE — OR
[facility]
                                    [address], [place]  [address]
_________________________________________________________________________________________
                                                                 Signed   
 
 
DATE OF OPERATION:
04/17/2018
 
SURGEON:
Valdemar Torres MD
 
PREOPERATIVE DIAGNOSIS:
Cellulitis right knee incision.
 
POSTOPERATIVE DIAGNOSIS:
Infected total knee, right.
 
PROCEDURE PERFORMED:
1. Irrigation and debridement of skin, subcutaneous tissue, and bone right knee.
2. Exchange of polyethylene right knee.
 
SURGEON:
Valdemar Torres MD
 
ASSISTANT:
MEGGAN Griffin.  Kayce was present for the entire procedure, was
critical for positioning, retraction, and wound closure.  Second is Sivan Rg,
MS4. 
 
ANESTHESIA:
Spinal.
 
BLOOD LOSS:
100 mL.
 
TOURNIQUET TIME:
Two cultures were taken, two anaerobic and two aerobic cultures were taken from the knee.
 
BRIEF HISTORY:
Ynes is a 77-year-old female was admitted to an [facility] hospital with cellulitis of
the knee and pneumonia.  She was also hyponatremic, and fluid overloaded.  Medically,
she was placed on IV antibiotics and did well.  However, knee continued to drain a
little bit inferiorly, and had some redness.  They contacted me and I had her come down
today to clinic where we elected to proceed with irrigation and debridement of the knee.
 Risks, benefits, and alternatives were discussed with her at length and she understood
and wished to proceed. 
 
 
    Electronically Signed By: VALDEMAR TORRES MD  04/18/18 0816
_________________________________________________________________________________________
PATIENT NAME:     YNES BLUM SWAPNA                 
MEDICAL RECORD #: [id number]            OPERATIVE REPORT              
          ACCT #: [id number]  
DATE OF BIRTH:   [birth year]            REPORT #: [id number]      
PHYSICIAN:        VALDEMAR TORRES MD              
PCP:              NOEMY BARRERA MD         
REPORT IS CONFIDENTIAL AND NOT TO BE RELEASED WITHOUT AUTHORIZATION
 
 
                                  [facility]
                                    [address], [place]  [address]
_________________________________________________________________________________________
                                                                 Signed   
 
 
DESCRIPTION OF PROCEDURE:
Once consent was obtained, she was taken to the operating room.  After adequate
anesthesia, the leg was placed in well-padded proximal thigh, tourniquet prepped and
draped in standard sterile fashion.  The prior incision was then opened longitudinally.
Immediately the arthrotomy was noted to be open over the inferior half.  The rest of the
arthrotomy was opened up and the sutures removed.  Soft tissue was debrided to remove
all devitalized tissue.  Again the tourniquet was not inflated so we could not check for
bleeding.  The synovectomy was then performed on the knee, and the polyethylene was
removed so we could get to the back of the knee and perform synovectomy and debridement
back there.  The knee was then pulse lavaged with 4.5 L of antibiotic irrigation.  The
metal and plastic surfaces were then scrubbed down using lap sponge soaked in hydrogen
peroxide.  We were able to get it all cleaned.  The knee was washed out with a further
1-1/2 L of antibiotic irrigation, and the new polyethylene was placed in the knee.  The
arthrotomy was then closed using #1 PDS using interrupted sutures.  The subcutaneous
tissue was closed using 0-PDS after washing it out with another L of antibiotic
irrigation under pulse lavage.  The skin was then closed using 2-0 nylon using
interrupted suture.  The knee was then dressed with a Mepilex dressing, ABD and a bulky
Mcdermott.  She was taken to the recovery room in satisfactory condition.  All sponge,
needle, and instrument counts were correct. 
 
 
 
            ________________________________________
            Valdemar Torres MD 
 
 
BA/MODL
Job #:  [id number]
DD:  04/17/2018 18:40:02
DT:  04/18/2018 01:42:17
 
 
Copies:                                
~
 
 
 
 
 
 
 
 
 
    Electronically Signed By: VALDEMAR TORRES MD  04/18/18 0816
_________________________________________________________________________________________
PATIENT NAME:     YNES BLUM SWAPNA                 
MEDICAL RECORD #: [id number]            OPERATIVE REPORT              
          ACCT #: [id number]  
DATE OF BIRTH:   [birth year]            REPORT #: [id number]      
PHYSICIAN:        VALDEMAR TORRES MD              
PCP:              NOEMY BARRERA MD         
REPORT IS CONFIDENTIAL AND NOT TO BE RELEASED WITHOUT AUTHORIZATION

## 2018-04-18 NOTE — NUR
PT HAS VISITORS IN ROOM AND KEEPS BENDING ARM. VANCO TRYING TO RUN. REMINDED
PT SHE NEEDS THE AB TO INFUSE AND TRY TO KEEP ARM STILL AS THE PICC LINE KEEPS
BEEPING.

## 2018-04-18 NOTE — NUR
PT EMOTIONAL TODAY, BUT DID PHYS THER. ONLY ABLE TO WALK IN ROOM. BEDSIDE
COMMODE. NO BM, URINE OUTPUT REMAINS LOW. DRESSING IN PLACE, UNABLE TO VISUAL
INCISION. HR IRREGULAR AT TIMES. EATING AND DRINKING WELL. 2 PERSON ASSIST.

## 2018-04-18 NOTE — NUR
SUPERVISED STUDENT RAINER FORTE GIVING MEDS. DETAILED MEDICATIONS AND REASONS FOR
TAKING AND SIDE EFFECTS. PT VERBALIZED UNDERSTANDING AND ASKED APPROPRIATE
QUESTIONS. DR BUSTILLO IN TO SEE PT.

## 2018-04-19 NOTE — NUR
RECEIVED BEDSIDE REPORT FROM DAY SHIFT RN. PT IS RESTING IN BED. CMS IN RIGHT
LEG INTACT. PT REPORTS PAIN AS 2/10. PT REQUESTING PAIN MEDICATION WITH NIGHT
TIME MEDICATIONS. PT ALSO REQUESTED TO USE BSC. TOLLERATED WELL WITH 2PA. [facility] NO OTHER NEEDS AT THIS TIME.

## 2018-04-19 NOTE — NUR
PATIENT ASSITED TO THE BSC. PATIENT TOLERATES ACTIVITY WELL. PATIENT IS A 2PA
W/FWW WITH A GAIT BELT. PATIENT RATES PAIN AT A 3/10. PATIENT GIVEN SCHEDULED
TYLENOL PER ORDER. PATIENT DENIES THE NEED FOR FURTHER PAIN MEDICATION.
PATIENT IS NOW IN RECLINER RESTING. PATIENT HAS TEDHOSE AND SCDS APPLIED TO
LEFT LEG. PATIENTS DRESSING C/D/I. NO FURTHER NEEDS NOTED. CALL LIGHT IN
REACH.

## 2018-04-19 NOTE — NUR
PATIENT REPORTS PAIN IN GROIN AND THE BURNING IN CALF HAVE RESOLVED. PATIENT
NOW READY TO WORK WITH PHYSICAL THERAPY. PATIENT TALKING ON PHONE WITH FAMILY,
DENIES ANY NEEDS AT THIS TIME.

## 2018-04-19 NOTE — NUR
PATIENT ASSISTED TO THE BSC AS A 2PA W/GAIT BELT AND FWW. PATIENT WAS ABLE TO
VOID. PATIENT IS BACK IN BED RESTING WITH SCDS AND TEDHOSE TO LEFT LOWER EXT.
PATIENT RATES PAIN AT A 4/10. PATIENT GIVEN PRN PAIN MEDICATION PER ORDER.
PATIENT DENIES ANY FURTHER NEEDS AT THIS TIME. CALL LIGHT IN REACH. AAOX3.

## 2018-04-19 NOTE — NUR
I WAS ABLE TO CATCH PT WITH NO OTHER ACTIVITY IN . HER  BUCK WELCOMED
ME AND PT DID AS WELL. THIS HAS BEEN VERY DIFFICULT FOR PT COMING BACK, WITH
SUBSEQUENT SURGERY. WE DEBRIEFED, LETTING HER VENT SOME. I OFFERED PT A PRAYER
SHAWL-NOT SURE SHE WANTED IT. EXPLAINED IT'S PURPOSE, AND THEN SHE DECIDED SHE
WOULD GIVE IT A TRY. ALSO LEFT A GUIDEPOST MAG. PT REQUESTED PRAYER, WILL
FOLLOW AS NEEDED

## 2018-04-19 NOTE — NUR
PT UP TO BSC VIA 2PA. PT TOLLERATED WELL. ASSESSMENT COMPLETED. DRESSING IS
C/D/I. PT REPORTS PAIN AT 2/10. OXYCODONE 10MG GIVEN WITH SCHEDULED TYLENOL.
CMS INTACT IN RLE. PULSES +2 ALL FOUR EXTREMITIES. LUNGS SOUND CLEAR. HEART
TONE WNL. CALL LGIHT WITHIN REACH. REPORTS NO OTHER NEEDS AT THIS TIME.

## 2018-04-19 NOTE — NUR
PATIENT UP AMBULATING IN ROOM, URINATING WELL. PAIN WELL CONTROLLED. RATES
PAIN 2/10 ON PAIN SCALE REST, AND 4/10 ON PAIN SCALE WITH ACTIVITY.
ADMINISTERED 2 TABS OF oxy FOR COMPLAINTS OF PAIN IN GROIN.

## 2018-04-19 NOTE — NUR
HELPED PHYSICAL THERAPY GET HER UP FROM THE BEDSIDE COMMODE. ALSO  WHILE
PHYSICAL THERAPY WAS WALKING HER I WAS FOLLOWING WITH THE WHEELCHAIR SO IF SHE
GOT TIRED SHE COULD SIT DOWN. PATIENT IS IN BED NOW ALSO REFILLED HER CUP WITH
ICE WATER. SHE  FEET AROUND MED SURG.

## 2018-04-19 NOTE — NUR
PATIENT BACK TO BED AFTER LARGE BM. PATIENT STATES " I AM FEELING MUCH
BETTER". CALL LIGHT WITHIN REACH.

## 2018-04-19 NOTE — NUR
PATIENT IS RESTING IN BED WITH EYES CLOSED. BREATHING IS EVEN AND UNLABORED,
RR 16. CALL LIGHT IN REACH.

## 2018-04-19 NOTE — NUR
PATIENT IN RECLINER, LEG ELEVATED. DSG TO RIGHT KNEE C/D/I. PAIN WELL MANAGED,
PROVIDED PATIENT WITH OXYCODONE THIS MORNING SECONDARY TO PHYSICAL THERAPY
GOIND TO WORK WITH HER. PATIENT COMPLAINS OF CONSTIPATION, PROVIDED
SUPPOSITORY.

## 2018-04-19 NOTE — NUR
ROUNDED AS CHARGE. PATIENT IS RESTING IN BED WATCHING TV. NO PAIN OR NAUSEA
NOTED. NO NEEDS NOTED. CALL LIGHT IN REACH.

## 2018-04-19 NOTE — NUR
PATIENT RESTED WELL THROUGHOUT THE SHIFT. PATIENT IS A 2PA W/GAIT BELT AND
FWW. PATIENT IS ON A REGULAR DIET, TOLERATING WELL, NO NAUSEA NOTED. PATIENT
RECEIVED PRN PAIN MEDICATION X2. PATIENT HAS A PICC IN RIGHT UPPER ARM THAT IS
HEPLOCKED WHEN NOT IN USE AND FLUSHES WELL. PATIENT USES BSC. PATIENT HAS
TEDHOSE, AND SCD ON LEFT LOWER LEG. PATIENT REFUSES HEEL PROTECTORS DESPITE
EDUCATION. PATIENT HAS CAST PADDING ON HER RIGHT LEG THAT IS C/D/I, NO
DRAINAGE NOTED. PATIENT GIVEN CRACKERS BEFORE PO MEDICATION ADMINISTERED.
PATIENT IS NO RA. PATIENT IS AAOX3 AND USES CALL LIGHT PER ORDER.

## 2018-04-19 NOTE — NUR
PATIENT UP WITH PHYSICAL THERAPY, AMBULATED 60 FEET WITH WALKER. COMPLAINTS OF
BURNING PAIN IN CALF, PROVIDED PATIENT WITH ICE, PAIN IMPROVED. DSG C/D/I,
PATIENT BACK TO BED. BED BATH PROVIDED.

## 2018-04-19 NOTE — EKG
[facility] [facility]
                                    [address]
                                  [place]  [address]
_________________________________________________________________________________________
                                                                 Signed   
 
 
Normal sinus rhythm
Low voltage QRS
Borderline ECG
No previous ECGs available
Confirmed by BRENDAN BELLE MD ([id number]) on 4/19/2018 8:51:42 PM
 
 
 
 
 
 
 
 
 
 
 
 
 
 
 
 
 
 
 
 
 
 
 
 
 
 
 
 
 
 
 
 
 
 
 
 
 
 
 
 
    Electronically Signed By: BRENDAN BELLE MD  04/19/18 2051
_________________________________________________________________________________________
PATIENT NAME:     CHICO LBUM SWAPNA                 
MEDICAL RECORD #: [id number]                     Electrocardiogram             
          ACCT #: [id number]  
DATE OF BIRTH:   [birth year]                                       
PHYSICIAN:   BRENDAN BELLE MD           REPORT #: [id number]
REPORT IS CONFIDENTIAL AND NOT TO BE RELEASED WITHOUT AUTHORIZATION

## 2018-04-19 NOTE — NUR
FAXED CHART NOTES TO [facility] TODAY AS PT WANTS TO GO THERE FOR SWING BED.  FAXED
INCLUDED FACESHEET, H AND P, PROG NOTES, OP NOTE, IMAGING, LAB, PT EVAL AND
NOTES TO THEM RECIEVED FAX CONFIRMATION.  TALKED WITH ZENA JOYNER ABOUT THIS
[phone], FAX [phone].

## 2018-04-20 NOTE — NUR
ROUNDED WITH DR. BELLE. ADMINISTERED SECOND PILL OF OXYCODONE. PATIENT
CONCERNED ABOUT TWITCHING IN ARMS, ADDRESSING CONCERNS WITH DR. BELLE.
PROVIDED PATIENT WITH ICE WATER.

## 2018-04-20 NOTE — NUR
PT STATED THAT SHE WOULD TRY CALLING THE [facility] AND SEE IF THEY COULD GIVER HER
A RIDE.  SHE DID CALL AND THEY AGREED TO PICK HER UP TOMORROW.
CALLED THE LOOP IN [place] AND THEY ARE PICKING HER UP AFTER SHE IS DISCHARGED
TOMORROW.

## 2018-04-20 NOTE — NUR
PATIENT SITTING UP IN BEDSUING "IS"  IN ROOM. VITALS AND I/OS DONE. PT
MARILUZ IN ROOM FOR THERAPY. FRESH ICE WATER GIVEN CALL LIGHT IN REACH

## 2018-04-20 NOTE — NUR
SN assisted patient to the restroom to use commode and bathe. Dressing on R
knee was removed during bathing and reapplied immediately afterward. Patient
was able to walk to the bathroom and back to her bed without any complaints of
pain. Wound had minor drainage. Patient had minor SOB during bathing. Patient
is now sitting upright in bed eating lunch.

## 2018-04-20 NOTE — NUR
PATIENT VOIDING WELL THROUGHOUT DAY, UA SENT TO LAB. CHEST XRAY DONE,
NEGATIVE. SURGICAL BANDAGE ROMOVED BY MD, SHOWERED. SURGICAL SITE INTACT WITH
NO SIGNS OF INFECTION. MEPILEX PLACED WITH ABD AND ACE WRAP TO REINFORCE.
PATIENT AMBULATED LONGER DISTANCE IN HALLS WITH PHYSICAL THERAPY, TOLERATED
WELL. LUNG SOUNDS CLEAR. PAIN WELL CONTROLLED WITH OXYCODONE AND SCHEDULED
TYLENOL AND GABAPENTIN. PLAN TO DISCHARGE TO [facility] TOMORROW FOR
PHYSICAL THERAPY NEEDS.

## 2018-04-20 NOTE — NUR
Late entry for 0745. Patient used call light to request assistance to the
restroom. SN assisted patient to the commode. SN then assisted patient back to
her bed. Patient's surgeon then entered room to discuss R knee and change
dressings. SN then offered patient her breakfast amd adjusted bed. Call light
was given to patient and she had no concerns at that time other than minor
discomfort, which SN explained that pain medication was available at 0900. SN
offered an ice pack, patient denied.

## 2018-04-20 NOTE — NUR
Patient expressed to SN that she was experiencing some discomfort while lying
in bed and requested PRN pain medication prior to completing her morning PT.
Patient
describes pain as an ache all over body, specifically in her R knee. SN
administered 10 mg of Oxycodone for pain and readjusted patient. PT to see
patient soon.

## 2018-04-20 NOTE — NUR
ROUNDED AS CHARGE. PATIENT IS BEING ASSISTED BACK TO BED BY CNA. NO NEEDS
NOTED. CALL LIGHT IN REACH.  AT THE BEDSIDE.

## 2018-04-20 NOTE — NUR
HELPED PT TO THE BEDSIDE COMMODE WITH THE HELP OF HEATHER EAGLE. HELPED PT BACK
TO BED FROM THE COMMODE WITH THE HELP OF RAINER BALLESTEROS. BEDSIDE TABLE AND CALL LIGHT
WITHIN REACH. PT NEEDS NOTHING ELSE AT THIS TIME.

## 2018-04-20 NOTE — NUR
PT A/O IN BED.  AT BEDSIDE. PT REPORTS PAIN 2/10. REPORTS NO NEEDS AT
THIS TIME. CALL LIGHT WITHIN REACH. SCD AND TEDHOSE ON LEFT LEG. HEEL
PROTECTORS IN PLACE. DRESSING C/D/I. CMS INTACT.

## 2018-04-20 NOTE — NUR
ASSISTED PATIENT BACK TO BED FROM [facility]. PATIENT ABLE TO TOLERATE TRANSFER WITH
FWW AND SOME ASSIST WITH HER RIGHT LEG. ENCOURAGED PATIENT TO DO AS MUCH FOR
HERSELF AS POSSIBLE. PATIENT REPORTS GOOD PAIN CONTROL AT THIS TIME. ACE WRAP
ON RIGHT KNEE INTACT. NO DRAINAGE NOTED. PATIENT RESTING COMFORTABLY. SCD ON
LEFT LEG WITH ANGELA HOSE. RIGHT LEG ELEVATED ON PILLOW, ONLY SHIN & FOOT. CALL
LIGHT IN REACH.

## 2018-04-20 NOTE — NUR
Primary nurse suggested applying Mediplex and transparent dressing as this is
commonly ordered by Dr. Torres. No orders addressing this; however, mediplex
foam and transparent dressing were applied by nursing students and instructor
. ACE bandage applied over dressing;
pillow positioned under leg maintaining no more than a 20 degree flexion.

## 2018-04-20 NOTE — NUR
PT UP TO COMMODE VIA 2PA WITH FWW. PT REPORTS PAIN OF 5/10 WHEN MOVING.
REQUESTED PAIN MEDICATION. ASSESSMENT COMPLETED. NO CHANGES. CMS INTACT IN
RLE. CALL LIGHT WITHIN REACH.

## 2018-04-20 NOTE — NUR
PT SLEPT WELL THROUGHOUT THE NIGHT. PULSE +2 RLE. DRESSING IS C/D/I. PT
REPORTS NO TINGLING OR NUMBNESS. TOTAL WEIGHT BEARING ON RIGHT LEG. 2PA WITH
FWW. VANCO TROUGH THIS AM. ON REGULAR DIET. NO N/V. TEDHOSE AND SCD ON LEFT
LEG. PAIN 5/10 OXYCODONE 10MG AND TYLENOL SCHEDULED GIVEN AT 0500. PICC IN
DEBBIE. PT IS AAO.

## 2018-04-20 NOTE — NUR
SN administered patient's ceftriaxone via her PICC. SN also rebandaged the
wrap on patient's R knee and lower leg. Patient states that her pain is
improving but occasionally increases with movement. Patient is currently
resting in bed with knee and leg elevated with no other complaints.

## 2018-04-20 NOTE — NUR
SHIFT REPORT RECEIVED. PATIENT RESTING IN BED.  IS ROOM. NO NEEDS AT
THIS TIME. CALL LIGHT IN REACH.

## 2018-04-20 NOTE — NUR
VISITED WITH PT'S  BUCK IN [place]. HE SHARED WITH ME THAT THIS WHOLE
EXPERIENCE HAS BEEN DIFFICULT ON HIM AS WELL. HE HAS HAD DIFFICULTY SEEING PT
STRUGGLE WITH PAIN, AND THEM HAVING TO COME BACK. DEALING WITH THE INFECTION
HAS PLACED PT ON HIGH LEVEL OF ANXIETY. BUCK MENTIONED THAT PT DOESN'T DEAL
WELL WITH PAIN, AND MAKES MATTERS WORSE. DISCUSSED PT BEING DC'D TO [facility] AT
SWING BED STATUS. HE STATED THAT WOULD BE MUCH EASIER ON THEM, GETTING CLOSE
TO HOME. EXTENDED A BLESSING, WILL FOLLOW AS NEEDED

## 2018-04-20 NOTE — NUR
HELPED PT TO THE BEDSIDE COMMODE AND BACK TO BED WITH THE HELP OF RAINER BALLESTEROS.
BEDSIDE TABLE AND CALL LIGHT WITHIN REACH. PT NEEDS NOTHING ELSE AT THIS TIME.

## 2018-04-21 NOTE — NUR
ASSISTED PATIENT UP TO BS, 2PA W/FWW. PATIENT REQUEST PRN PAIN MEDS WHICH
WERE GIVEN TO HER. HER KNEE IS SORE, AS IS THE REST OF HER BODY. PATIENT
CONTINUES TO HAVE A CONGESTED COUGH. ASSISTED HER BACK INTO BED. NO OTHER
NEEDS AT THIS TIME.

## 2018-04-21 NOTE — NUR
PATIENT APPEARS TO BE SLEEPING COMFORTABLY. SCDS ON. RIGHT LEG ELEVATED WITH
KNEE STRAIGHT. CALL LIGHT IN REACH.

## 2018-04-21 NOTE — NUR
PATIENT SLEPT WELL BETWEEN 2030 AND 0400. PAIN WELL CONTROLLED. PRN PAIN MEDS
X2. 2PA UP TO BSC W/FWW. URINE OUTPUT QS. SMALL BM THIS AM. NO NEW DRAINAGE ON
DRESSING. ACE WRAP OVER ABD PADS AND MEPILEX. SCDS BILATERALLY. ANGELA HOSE ON
LEFT LEG. PILLOW UNDER RIGHT FOOT TO ELEVATE LEG.

## 2018-04-21 NOTE — NUR
THIS CNA AND CHARGE NURSE ASSISTED PATIENT ONTO BEDSIDE COMMODE. 2 PERSON WITH
FWW. THIS CNA AND CNA PAULETTE ASSISTED PATIENT FROM BEDSIDE COMMODE TO CHAIR.
PATIENT STATES THAT SHE ASKED FOR PAIN MEDICATION EARLIER, BUT HAS NOT
RECIEVED IT YET. CHARGE NURSE NOTIFIED. PATIENT SITTING UP IN CHAIR RESTING.
PATIENTS  IN ROOM. CALL LIGHT WITHIN REACH. NO OTHER NEEDS AT THIS
TIME.

## 2018-04-21 NOTE — NUR
ROUNDED ON PATIENT AND RECIEVED VERABL HANDOFF FROM NIGHT SHIFT NURSE. PATIENT
SITTING UP IN RECLINER, PATIENT STATES " I HAD A PRETTY GOOD NIGHT, I AM READY
TO GO TO [facility] TODAY". DR. BUSTILLO ROUNDED ON PATIENT, PLAN TO DISCHARGE
TODAY, INSCISION C/D/I, NO SIGNS OF INFECTION. PATIENT REFUSED SHOWER.
COMPLAINTS OF DIARRHEA FROM STOOL SOFTENERS, HOLD MIRALAX TODAY.

## 2018-04-21 NOTE — NUR
HELPED RAINER BALLESTEROS GET PT TO THE BEDSIDE COMMODE AND BACK TO BED. BEDSIDE TABLE
AND CALL LIGHT WITHIN REACH.

## 2018-04-21 NOTE — NUR
PT IS RESTING IN BED SAFELY WITH CALL LIGHT IN REACH. PT IS DRESSED AND READY
TO BE DISCHARGED. VITALS TAKEN.

## 2018-09-12 ENCOUNTER — HOSPITAL ENCOUNTER (INPATIENT)
Dept: HOSPITAL 46 - MS | Age: 78
LOS: 169 days | Discharge: NURSING, REHAB OR LONG-TERM CARE | DRG: 470 | End: 2019-02-28
Attending: SPECIALIST | Admitting: SPECIALIST
Payer: MEDICARE

## 2018-09-12 VITALS — HEIGHT: 64 IN | WEIGHT: 283.01 LBS | BODY MASS INDEX: 48.32 KG/M2

## 2018-09-12 DIAGNOSIS — M79.7: ICD-10-CM

## 2018-09-12 DIAGNOSIS — Z79.82: ICD-10-CM

## 2018-09-12 DIAGNOSIS — G89.18: ICD-10-CM

## 2018-09-12 DIAGNOSIS — Z79.1: ICD-10-CM

## 2018-09-12 DIAGNOSIS — K21.9: ICD-10-CM

## 2018-09-12 DIAGNOSIS — I10: ICD-10-CM

## 2018-09-12 DIAGNOSIS — I48.1: ICD-10-CM

## 2018-09-12 DIAGNOSIS — E11.9: ICD-10-CM

## 2018-09-12 DIAGNOSIS — M17.12: Primary | ICD-10-CM

## 2018-09-12 DIAGNOSIS — Z96.651: ICD-10-CM

## 2018-09-12 DIAGNOSIS — E66.01: ICD-10-CM

## 2018-09-12 DIAGNOSIS — Z79.891: ICD-10-CM

## 2018-09-12 DIAGNOSIS — Z79.899: ICD-10-CM

## 2018-09-12 DIAGNOSIS — Z79.84: ICD-10-CM

## 2018-09-12 DIAGNOSIS — G89.4: ICD-10-CM

## 2018-09-12 DIAGNOSIS — Z88.0: ICD-10-CM

## 2018-09-12 DIAGNOSIS — Z88.8: ICD-10-CM

## 2018-09-12 DIAGNOSIS — Z79.02: ICD-10-CM

## 2018-09-12 DIAGNOSIS — Z88.1: ICD-10-CM

## 2018-09-12 PROCEDURE — C1776 JOINT DEVICE (IMPLANTABLE): HCPCS

## 2018-09-12 PROCEDURE — C1713 ANCHOR/SCREW BN/BN,TIS/BN: HCPCS

## 2018-09-24 NOTE — NUR
PATIENT HERE TODAY FOR PREADMISSION APPOINTMENT.  SHE IS ACCOMPANIED BY HER
 DLOORES.  SHE IS SCHEDULED ON 10/08/18 FOR A LEFT TOTAL KNEE REPLACEMENT.
IT IS RECALLED SHE HAD A RIGHT TOTAL KNEE REPLACEMENT IN APRIL.  SHE WOULD
LIKE PHYSICAL THERAPY SET UP AT [facility] IN [place].  SHE IS CURRENTLY
USING A 4 WHEELED WALKER AND STATES SHE HAS EVERYTHING FROM HER LAST SURGERY
AT HOME.  THIS INFORMATION WILL BE SENT TO DR BUSTILLO AND AMO PLANNING FOR
FURTHER FOLLOW UP.

## 2019-01-01 NOTE — NUR
PATIENT IN BED, EYES CLOSED. VITALS AND I/OS DONE. PATIENT IS HAVING
DIFFICULTY KEEPING EYES OPEN THIS EVENING, APPEARS TO BE FALLING ASLEEP MID
SENTENCE CALL LIGHT IN REACH. FAMILY IN ROOM The mother chose not to exclusively breastfeed upon admission.

## 2019-02-12 NOTE — NUR
PATIENT AND  HERE TODAY FOR PREADMISSION APPOINTMENT. SHE IS SHEDULED
TO HAVE A LEFT TOTAL KNEE REPLACEMENT ON 2/25/2019. SHE HAS RIGHT TOTAL KNEE
REPLACEMENT AND APPEARS TO BE DOING WELL. SHE REPORTS NOT HAVING ANY STEPS
INTO HER HOME AS HER  HAS BUILT A RAMP. SHE HAS NO STEPS IN HER HOME.
SHE HAS A WALKER AND SHOWER BENCH, WALK IN SHOWER AND HAND RAILS IN HER HOME
SHE WOULD LIKE TO BE
DISCHARGED TO [facility] ON SWING BED STATUS
AFTER HER STAY HERE. SHE WOULD LIKE
PHYSICAL THERAPY SET UP AT [facility]
SHE LIVES IN [place]. THIS INFORMATION WILL BE FAXED TO DR BUSTILLO OFFICE AND
CASE MANAGEMENT FOR FURTHER FOLLOW UP

## 2019-02-25 PROCEDURE — 3E0T33Z INTRODUCTION OF ANTI-INFLAMMATORY INTO PERIPHERAL NERVES AND PLEXI, PERCUTANEOUS APPROACH: ICD-10-PCS | Performed by: NURSE ANESTHETIST, CERTIFIED REGISTERED

## 2019-02-25 PROCEDURE — 8E0YXBZ COMPUTER ASSISTED PROCEDURE OF LOWER EXTREMITY: ICD-10-PCS | Performed by: SPECIALIST

## 2019-02-25 PROCEDURE — 3E0T3BZ INTRODUCTION OF ANESTHETIC AGENT INTO PERIPHERAL NERVES AND PLEXI, PERCUTANEOUS APPROACH: ICD-10-PCS | Performed by: NURSE ANESTHETIST, CERTIFIED REGISTERED

## 2019-02-25 PROCEDURE — 0SRD0J9 REPLACEMENT OF LEFT KNEE JOINT WITH SYNTHETIC SUBSTITUTE, CEMENTED, OPEN APPROACH: ICD-10-PCS | Performed by: SPECIALIST

## 2019-02-25 NOTE — NUR
SPINAL LEVEL REMAINS AT THE PT'S KNEES. SHE DENIES ANY PAIN OR OTHER PROBLMES.
SHE HAS HAD NO NAUSEA SINCE SHE WAS IN THE PACU. PT GIVEN A SIP OF WATER AT
THIS TIME. DRESSING REMAINS INTACT WITH THE CRYO CUFF FULL OF ICE WATER.

## 2019-02-25 NOTE — NUR
RECEIVED REPORT FROM DAY SHIFT RN. PATIENT IS RESTING IN BED WATCHING TV.
PATIENT DENIES ANY PAIN.  AT THE BEDSIDE. NO NEEDS NOTED. CALL LIGHT IN
REACH.

## 2019-02-25 NOTE — NUR
PT STOOD WITH PHYSICAL THERAPY  AND  A 2PA AND WAS WEAK ON HER FEET. PT BECAME
A BIT DIZZY AND LAYED BACK INTO HER BED. BP NOW /55.

## 2019-02-25 NOTE — NUR
PT RESTING IN HER BED AND REPORT RECEIVED FROM PACU RN AT 1510. PT DENIES ANY
PAIN AND IS NUMB FROM HER KNEES DOWN. DR BELLE NOW IN THE ROOM CHECKING ON THE
PT.

## 2019-02-25 NOTE — NUR
PATIENT IS RESTING IN BED WITH EYES CLOSED. CPOX IN PLACE, READINGS ARE WNL.
PATIENT IS ON RA. IV INFUSING. CALL LIGHT IN REACH.  ASLEEP ON COUCH.

## 2019-02-25 NOTE — NUR
Pt resting in bed with her spinal resolved. Her cbg this evening was 207 and
covered with insulin. Sat in the upper 90's on 2l via nc. Tatiana dressing, scd,
lisa hose, cyro cuff all on and running correctly. VSS. Pt had n/v in pacu but
none since. Pt is pain free and is moving her feet without difficulty.

## 2019-02-25 NOTE — NUR
02/25/19 1358 Mariza Madera
1335- PT ARRIVES TO PACU EASILY AROUSABLE TO VOICE. PT REPORTS NO PAIN
OR NAUSEA. PT HAD A SPINAL AND IS UNABLE TO MOVE HER TOES AT THIS
TIME AND REPORTS NUMBNESS. PT IS CONCERNED THAT SHE CAN'T MOVE HER
LEGS. EDUCATED PT THAT THIS IS NORMAL AS THE SPINAL NEEDS TIME TO WEAR
OFF. THE PT STATES UNDERSTANDING. RESP EVEN AND UNLABORED. OXYGEN SAT
MID TO HIGH 90'S ON RA.
1338- CRYOCUFF PLACED BY RAINER HOOVER.
1342- BLOOD SUGAR 124 TAKEN BY RAINER HOOVER.
1350- PT'S OXYGEN SAT DROPPED TO 87% ON RA. PT ENCOURAGED TO COUGH AND
DEEP BREATHE. PT IS ABLE TO PERFORM THESE TASKS AND OXYGEN SAT
INCREASED TO MID TO HIGH 90'S ON RA.
1357- PT EATING ICE CHIPS. TOLERATING WELL. PT GIVEN CHAP STICK PER
REQUEST.

## 2019-02-25 NOTE — NUR
PATIENT ASSESEMENT COMPLETED. PATIENT ASSISTED TO USE THE [facility]. PATIENT WAS A
2PA W/FWW. PATIENT WAS ABLE TO VOID. PATIENT IS BACK RESTING IN BED. PATIENT
HAS TEDHOSE AND FOOT SCDS APPLIED TO BILAT LOW EXT. PATIENT HAS CRYO APPLIED
TO LEFT KNEE, CRYO REFILLED WITH ICE. PATIENT IS ON RA, CPOX INPLACE. PATIENTS
EVENING MEDICATIONS GIVEN PER ORDER. PATIENT HAS THEODORE IN PLACE, DRAINAGE
NOTED, AND GREEN LIGHT FLASHING. ON-Q PUMP IN PLACE. SCOPE PATCH IN PLACE
BEHIND RIGHT EAR. PATIENT DENIES ANY PAIN. PATIENTS  IS PRESENT.
PATIENT DENIES ANY NEEDS. CALL LIGHT IN REACH. IV INFUSING.

## 2019-02-25 NOTE — OR
[facility]
                                    [address] [place], [place]  [address]
_________________________________________________________________________________________
                                                                 Signed   
 
 
DATE OF OPERATION:
02/25/2019
 
SURGEON:
Valdemar Torres MD
 
PREOPERATIVE DIAGNOSIS:
Degenerative joint disease, left knee.
 
POSTOPERATIVE DIAGNOSIS:
Degenerative joint disease, left knee.
 
PROCEDURE PERFORMED:
Left total knee arthroplasty with computer navigation.
 
ASSISTANT:
MEGGAN Griffin.  Kayce was present and critical for all portions of
the procedure. 
 
ANESTHESIA:
Spinal.
 
BLOOD LOSS:
150 mL.
 
TOURNIQUET TIME:
Zero.
 
IMPLANTS:
Jewel Triathlon size 4 femur, 3 tibia, 11 mm insert, and 32 patella.
 
BRIEF HISTORY:
Ynes is a 78-year-old female with bilateral knee arthritis.  She had undergone a
successful right total knee and wished to proceed with a left.  Risks and benefits of
operative treatment were discussed with her and she elected to proceed. 
 
DESCRIPTION OF PROCEDURE:
Once consent was obtained, she was taken to the operating room.  After adequate
anesthesia, she was placed on operating room table.  All downside pressure points well
padded.  The left leg was placed in well-padded proximal thigh tourniquet, placed on a
hip bump.  Leg was then prepped and draped in the standard sterile fashion and the knee
was approached through a standard straight incision anteriorly.  This was taken through
 
    Electronically Signed By: VALDEMAR TORRES MD  02/25/19 1404
_________________________________________________________________________________________
PATIENT NAME:     YNES BLUM                 
MEDICAL RECORD #: [id number]            OPERATIVE REPORT              
          ACCT #: [id number]  
DATE OF BIRTH:   [birth year]            REPORT #: [id number]      
PHYSICIAN:        VALDEMAR TORRES MD              
PCP:              NOEMY BARRERA MD         
REPORT IS CONFIDENTIAL AND NOT TO BE RELEASED WITHOUT AUTHORIZATION
 
 
                                  [facility]
                                    [address], [place]  [address]
_________________________________________________________________________________________
                                                                 Signed   
 
 
skin and subcutaneous tissue.  A median parapatellar arthrotomy was performed.  All
bleeders were cauterized as we went.  The infrapatellar fat pad was excised and the MCL
was elevated of a sleeve around the posterior medial corner.  The knee was then flexed
and the menisci were transected as was the ACL.  The navigation guide was pinned to the
distal femur and the femur was registered with the computer.  The distal femoral cutting
block was then pinned in neutral alignment.  The distal femoral cut was made and all
osteophytes were removed.  The distal femur was sized to a 4, which matched for opposite
side.  The AP cutting block was then pinned in line with the epicondylar axis.
Anterior, posterior, and chamfer cuts were made.  All bone was excised and the remaining
osteophytes were removed.  The attention was turned to proximal tibia.  The navigation
guide was pinned to the proximal tibia and the tibia was registered with the computer.
The cutting block was then pinned in alignment to take 2 mm off the most involved
posterior lateral corner.  The bone cut was made with care taken to protect the patellar
tendon and MCL.  The meniscal remnants removed, posterior osteophytes removed off the
femur.  Posterior release was performed.  The flexion and extension gaps were sized,
found to be symmetric at 11 mm.  The trials were positioned.  Knee was taken through
range of motion and found to be stable from 0 to about 100 degrees, which is pretty
close to the thigh calf impingement.  The patella was cut, sized, and drilled for a 32
patella.  The distal femoral drill holes were made and keel punch was used for the
tibia.  The bone surfaces were pulse lavaged and packed with a hydrogen peroxide soaked
sponge.  The cement was mixed and reached proper consistency, it was placed on all
implants on bone surfaces.  Tibia was impacted into position first followed by the
polyethylene.  All excess cement removed.  The femur was impacted in position.  Again,
any remaining overflow was removed.  The knee was extended and nicely loaded.  The
patella was clamped and any remaining cement was removed.  The cement was allowed to
harden.  Once it hardened sufficiently, the knee was flexed and the remaining cement was
removed.  The periarticular soft tissues were injected with 100 mL of ropivacaine and
Toradol mixture.  The On-Q pump was then placed in the adductor canal from the
suprapatellar pouch.  The arthrotomy was then closed using #2 Stratafix in the
subcutaneous tissue with 0 Stratafix, and the skin with Dermabond.  Wound was dressed
with a THEODORE wound dressing and Ace wrap.  She tolerated the procedure well.  All sponge,
needle, and instrument counts were correct. 
 
 
 
            ________________________________________
            Valdemar Torres MD 
 
 
BA/MODL
Job #:  [id number]
DD:  02/25/2019 13:18:43
 
    Electronically Signed By: VALDEMAR TORRES MD  02/25/19 1404
_________________________________________________________________________________________
PATIENT NAME:     YNES BLUM                 
MEDICAL RECORD #: [id number]            OPERATIVE REPORT              
          ACCT #: [id number]  
DATE OF BIRTH:   [birth year]            REPORT #: [id number]      
PHYSICIAN:        VALDEMAR TORRES MD              
PCP:              NOEMY BARRERA MD         
REPORT IS CONFIDENTIAL AND NOT TO BE RELEASED WITHOUT AUTHORIZATION
 
 
                                  [address]  [address]
_________________________________________________________________________________________
                                                                 Signed   
 
 
DT:  02/25/2019 13:36:16
 
 
Copies:                                
~
 
 
 
 
 
 
 
 
 
 
 
 
 
 
 
 
 
 
 
 
 
 
 
 
 
 
 
 
 
 
 
 
 
 
 
 
 
 
    Electronically Signed By: VALDEMAR TORRES MD  02/25/19 1404
_________________________________________________________________________________________
PATIENT NAME:     YNES BLUM SWAPNA                 
MEDICAL RECORD #: [id number]            OPERATIVE REPORT              
          ACCT #: [id number]  
DATE OF BIRTH:   [birth year]            REPORT #: [id number]      
PHYSICIAN:        VALDEMAR TORRES MD              
PCP:              NOEMY BARRERA MD         
REPORT IS CONFIDENTIAL AND NOT TO BE RELEASED WITHOUT AUTHORIZATION

## 2019-02-26 NOTE — NUR
SPOKE WITH DR. BUSTILLO REGARDING NEW PROGRESSION % SAROSANG SATURATION TO
LEFT KNEE DRESSING. COMMUNICATED TO DR. BUSTILLO THAT THE DRESSING STILL REMAINS
CLOSED AT ALL BORDERS, THEODORE AND ON-Q INTACT. THEODORE DEVICE FLASHING GREEN
INDICATING OK AT THIS TIME. THIS AM PT'S DRESSING WAS APPROX 50% SATURATED
DURING AM ASSESSMENT. DR. BUSTILLO STATED PT IS OK TO WORK WITH PT TODAY,
AGAIN.

## 2019-02-26 NOTE — NUR
CALLED DUE TO EXTENSIVE DRAINAGE POOLED UNDER THEODORE DRESSING. THEODORE
REMAINS SEALED AND INTACT.  GAVE ORDER TO CHANGE OUT THEODORE DRESSING AT
THIS TIME, ADD ABD PADS AND ACE WRAP WITH COMPRESSION.  ALSO NOTIFIED
AND UPDATED AS HE IS AT NURSES STATION AT THIS TIME.

## 2019-02-26 NOTE — NUR
PATIENT RESTED WELL THROUGHOUT THE SHIFT. PATIENT IS ON AN ADA DIET,
TOLERATEING WELL, NO COMPLAINTS OF NAUSEA. PATIENT IS ON RA, CPOX IN PLACE.
PT/OT. PATIENT HAS FOOT SCDS AND TEDHOSE ON BILAT LOW EXT. PATIENT HAS CRYO
APPLIED TO LEFT KNEE. PATIENT HAS ON-Q TO 8. THEODORE IN PLACE, FLASHING GREEN.
DRAINGE NOTED WITH AN INCREASE OVERNIGHT. PATIENT HAS SCOPE BEHIND RIGHT EAR.
PATIENT HAS DENIED THE NEED FOR PAIN MEDICATION. PATIENT IS A 2PA W/FWW TO
[facility]. PATIENT IS AAOX3. PATIENT USES CALL LIGHT APPROPRIATELY.

## 2019-02-26 NOTE — NUR
PATIENT UP TO BATHROOM AND BACK TO BED, 2PA FWW. CRYO FILLED. CALL LIGHT IN
REACH. NO FURTER NEEDS AT THIS TIME.

## 2019-02-26 NOTE — NUR
PATIENTS MORNING MEDICATIONS GIVEN PER ORDER. PATIENT DENIES ANY PAIN. PATIENT
IS RESTING IN BED. CRYO, SCDS, TEDHOSE, IN PLACE. THEODORE HAS GREEN LIGHT
SLIGHT IN CREASE IN DRAINAGE. ON-Q SE TO 8. AAOX3. NO NEEDS NOTED. CALL LIGHT
IN REACH.  ASLEEP ON COUCH.

## 2019-02-26 NOTE — NUR
Left knee dressing is approx 50% saturated with sarosang drainage. Dressing is
intact at all borders. Kimberlee dressing is intact/on-q is intact as well. Green
flashing "ok" present of kimberlee device.

## 2019-02-26 NOTE — NUR
FAXED CHART NOTES INCLUDING FACE SHEET, H AND P X 2, OP NOTE, PROG NOTE, PT
EVAL TO [facility] FOR SWING BED ADMISSION.  RECIEVED FAX CONFIRMATION.

## 2019-02-26 NOTE — NUR
Left knee dressing has more sarosang drainage noted throughout dressing. All
borders of dressing are closed, no leakage noted. On-Q pump and kimberlee dressing
intact. Green light flashing ok on device. Bulky dressing over skin site
dressing; CDI.

## 2019-02-26 NOTE — NUR
Pt awake, alert and oriented x3. Pt is on ra, resp even and non labored. Pt
reports pain level of 2/10 in left knee. Personal supplies and call light
within reach.

## 2019-02-26 NOTE — NUR
RECEIVED REPORT FROM DAY SHIFT RN. PATIENT IS RESTING IN BED. PATIENTS THEODORE IS
OBSEREVED TO HAVE INCREASED DRAINGE. THELMA RN TO CALL MD.

## 2019-02-26 NOTE — NUR
SAMUEL CHANGED PER  TELEPHONE ORDER. THEODORE REMOVED, AS DRESSING EDGE
LIFTED UP DRAINAGE RAN OFF ON PAD PLACED UNDER LEG. CLEAR MESH OVER
INCISION, WEEPING DRAINAGE NOTED MID INCISION AND DISTAL INCISION. NEW THEODORE
DRESSING PLACED, EDGES SEALED, VACUUM RE-ESTABLISHED, ABDS PLACED, ACE WRAP
PLACED WITH COMPRESSION. TANIA SPEAR ASSISTED THIS RN WITH DRESSING CHANGE.

## 2019-02-26 NOTE — NUR
PT WANTS TO GO TO THE SWING BED PROGRAM AT [facility].  CALLED
AND LEFT MESSAGES FOR NAZIA AT [facility].  CALLED AND SPOKE WITH CHRIS AT [facility] AND HE
SAID TO FAX CLINICALS TO THEM [phone].

## 2019-02-26 NOTE — NUR
PT SITTING UP IN CHAIR AT THIS TIME, RESP EVEN AND NON LABORED. PT REPORTS
5/10 LEFT KNEE PAIN. PERSONAL SUPPLIES WITHIN REACH.

## 2019-02-26 NOTE — NUR
PATIENTS VITALS TAKEN AND RECORDED. PATIENT ASSISTED TO THE RESTROOM. PATIENT
IS A 2PA W/FWW TO THE [facility]. PATIENT TOLERATED ACTIVITY WELL. PATIENT WAS ABLE
TO VOID. PATIENT IS BACK IN BED RESTING. PATIENT HAS FOOT SCDS, TEDHOSE ON
VILAT LOW EXT. PATIENT HAS CRY APPLIED TO LEFT KNEE AND REFILLED WITH ICE.
PATIENT HAS NO INCREASE IN DRAINAGE ON PIC. ON-Q IN PLACE. THEODORE HAS GREEN
LIGHT FLASHING. PATIENT RATES PAIN AT A 2/10 AFTER ACTIVITY. PATIENT DENIES
THE NEED FOR PAIN MEDICATION AT THIS TIME. PATIENT DENIES ANY FURTHER NEEDS.
AAOX3. CALL LIGHT IN REACH.

## 2019-02-26 NOTE — NUR
PATIENT ASSESMENT COMPLETED. PATIENT ASSISTED TO THE [facility] AS A 2PA W/FWW.
MAX SUPPORT PROVIDED FOR LEFT KNEE. PATIENT WAS ABLE TO VOID 800ML. PATIENT IS
NOW BACK IN BED RESTING. FOOT SCDS, AND TEDHOSE IN PLACE IN BILAT LOW EXT.
PATIENTS CRYO REFILLED WITH ICE AND APPLIED TO LEFT KNEE. THEODORE IS 25%
SATURATED AND DRAINAGE IS UNCHANGED FROM NEW DRESSING PLACEMENT. THEODORE IS
VIBRATING INT BUT GREEN LIGHT IS FLASHING. ON-Q SET TO 8. PATIENTS VITAL TAKEN
AND RECORDED. INTAKE AND OUPUT RECORDED. PATIENT RATES PAIN AT A 3/10. PATIENT
GIVEN PRN PAIN MEDICATION PER REQUEST AND ORDER. PATIENTS EVENING MEDICATIONS
GIVEN PER ORDER. PATIENTS IV IS NOW SL AFTER TRANCEMIC ACID IS COMPLETED
INFUSING. PATIENT IS AAOX3. PATIENT DENIES ANY FURTHER NEEDS. CALL LIGHT IN
REACH.

## 2019-02-26 NOTE — NUR
MARYCHUY SPEAR ASSISTED WITH THEODORE DRESSING CHANGE. SURGICAL SITE INTACT. NEW THEODORE
DRESSING APPLIED PER GUIDANCE FROM THELMA SPEAR. THEODORE IS VIBRATING BUT GREEN LIGHT
IS FLASHING. PATIENT TOLERATED DRESSING CHANGE WELL. ABD APPLIED OVER THEODORE AND
ACE WRAP APPLIED OVER TOP. PATIENT DENIES ANY NEEDS. CALL LIGHT IN REACH.

## 2019-02-26 NOTE — NUR
TRANSEMIC ACID GIVEN PER ORDER. PATIENT EDUCATED ON MEDICATION AND ALL
QUESTIONS ANSWERED. IV INFUSING PER ORDER.

## 2019-02-26 NOTE — NUR
PT UP AMBULATING IN HALLS WITH P.T. FOR FIRST TIME SINCE SURGERY.  IS
FOLLOWING CLOSE BEHIND. PT STOPPED WHEN SHE SAW ME, AND WARMLY GREETED ME. I
GAVE ENCOURAGEMENT TO HER, WILL FOLLOW AS NEEDED

## 2019-02-26 NOTE — NUR
PATIENT CALLED AND REQUESTED PRN PAIN MEDICATION. PATIENT RATES PAIN AT A
3/10. PATIENT GIVEN PRN PAIN MEDICATION PER ORDER. PATIENTS THEODORE REMAINS 25%
SATURATED. CRYO REAPPLIED TO LEFT KNEE. NO FURTHER NEEDS. NOTED. CALL LIGHT IN
REACH.

## 2019-02-26 NOTE — NUR
V/S AND I&O DONE AND CHARTED. 2 PA TO BEDSIDE COMMODE AND BACK TO BED USING
WALKER. CRYO CUFF AND ICE WATER REFILLED.  IS IN THE ROOM. CRYO, SCD
ARE BACK ON. CALL LIGHT WITHIN REACH.

## 2019-02-26 NOTE — NUR
DR. BUSTILLO NOTIFIED REGARDING NEW BLOOD POOLING UNDER LEFT KNEE DRESSING.
DRESSING TO BE CHANGED OVER TO NEW THEODORE, PER NEIL RENEE RN.

## 2019-02-27 NOTE — NUR
VITALS ,I&OS AND BLOOD SUGAR DONE AND CHARTED. FRESH ICE WATER GIVEN. BEDSIDE
TABLE AND CALL LIGHT IN REACH. PT NEEDS NOTHING MORE AT THIS TIME.

## 2019-02-27 NOTE — NUR
CALLED  IN REGARDS TO PATIENT PARTICIPATING WITH PHYSICAL THERAPY.
ORDER OK TO WORK WITH PHYSICAL THERAPY NO NEED BENDING.

## 2019-02-27 NOTE — NUR
THIS CNA REFILLED PATIENTS CRYO CUFF AND ICE WATER. PATIENT CALL LIGHT IN
REACH. NO OTHER NEEDS AT THIS TIME.

## 2019-02-27 NOTE — NUR
Pt resting supine in bed, left knee ace wrap appears to be comming off. Ace
wrap removed and rewrapped with new ACE wrap, cryo cuff reapplied. dsg remains
saturated but intact and kimberlee dsg appears to be maintaining suction with
green light flashing. Distal cms to left le intact. Pt denies
pain. Pt provided with fresh ice water. Call light also in reach. Pt denies
needs/concerns. Family remains at bedside.

## 2019-02-27 NOTE — NUR
This SN and CNA assisted pt to bedside commode using walker. Pt voided without
difficulty. Pt states an increase in pain during ambulation.  Pt denies PRN
pain med. Pt verbalizes no other needs at this time. Call light in reach.
Family at bedside.

## 2019-02-27 NOTE — NUR
ASSISTED RAINER MARIN TO HAVE PATIENT USE THE BEDSIDE COMMODE. PATIENT IS BACK
IN BED. CRYO SCD AND CONTINUOUS PULSE OXIMETRY ARE BACK ON. CRYO CUFF AND ICE
WATER REFILLED. CALL LIGHT AND BEDSIDE TABLE WITHIN REACH.

## 2019-02-27 NOTE — NUR
AM PO meds given. Pt able to swallow independently without difficulty. This SN
and instructor assisted pt to bedside commode using walker. Gait unsteady and
weak. Pt voided without difficulty. Urine clear yellow, no odor noted. Pt was
returned to bed. Call light within reach.  in room.

## 2019-02-27 NOTE — NUR
BG levels were obtained using Accu check device. BG level was 133. Pt states
this is higher than usual as she usually ranges  at home. Pt verbalizes
understanding that this is probably due to meds. Pt has no concerns or
questions at this time.

## 2019-02-27 NOTE — NUR
PT RESTING SUPINE IN BED, ASSESSMENT COMPLETED. PT REPORTS PAIN OF 2/10 TO
LEFT KNEE THAT IS WORSE WITH MOVEMENT. CMS INTACT DISTAL TO LEFT KNEE. LEFT
KNEE THEODORE DSG APPEARS SATURATED BUT INTACT WITH GREEN LIGHT FLASHING AND
APPEARS UNCHANGED FROM HOW IT APPEARED THIS MORNING DURING SHIFT REPORT. PT
REQUESTED AND RECEIVED PRN PO 10MG OXYCODONE. CALL LIGHT AND H20 IN REACH. PT
DENIES FURTHER NEEDS/CONCERNS. FAMILY REMAINS AT BEDSIDE.

## 2019-02-27 NOTE — NUR
This SN and CNA assisted pt to bedside commode using walker. Gait continues to
be unsteady and weak. Pt voided without difficulty. Urine clear yellow, no
odor noted. Pt was returned to bed. Call light within reach.

## 2019-02-27 NOTE — NUR
Pt sitting up in bed, pt ate 100% of lunch, 1 unit  sliding scale insulin
administered. Family at bedside.  Kimberlee dsg remains saturated but
intact with green light flashing to kimberlee wound vac. On Q pump remains set to
8. call light and h20 in reach. Pt denies needs/concerns. cms intact distal to
left le.

## 2019-02-27 NOTE — NUR
PATIENT ASSISTED TO THE RESTROOM AS A 2PA W/FWW TO BSC. PATIENTS LEFT LEG
SUPPORT W/MOVEMENT. PATIENT TOLERATED ACTIVITY WELL. PATIENT WAS ABLE TO VOID.
PATIENT RATES PAIN AT A 2/10. PATIENT GIVEN PRN PAIN MEDICATION PER ORDER.
PATIENT IS BACK IN BED RESTING. PATIENT HAS TEDHOSE AND FOOT SCDS APPLIED TO
BILAT LOW EXT. PATIENTS CRYO REFILLED WITH ICE AND APPLIED TO LEFT KNEE.
PATIENTS THEODORE DRESSING IS ABOUT 85% SATURATED. THEODORE CONTINUES TO VIBRATE INT,
BUT GREEN LIGHT IS FLASHING. NO FURTHER NEEDS NOTED. CALL LIGHT IN REACH.

## 2019-02-27 NOTE — NUR
PO pain med given in anticipation of pain due to physical therapy. Pt reports
1 out 10 pain level that increases to 2 out of 10 pain during ambulation.

## 2019-02-27 NOTE — NUR
ASSESSMENT COMPLETE. MEDICATIONS GIVEN (SEE EMAR). BS WNL, NO INSULIN SS
GIVEN. PT EDUCATED ON SIGNS AND SYMPTOMS OF HYPOGLYCEMIA, PT VERBALIZED
UNDERSTANDING. VSS, IV SITE INFILTRATED WHEN FLUSHED. PT INITIALLY RELUCTANT
FOR RN TO PLACE NEW IV SITE, PT EDUCATED ON IMPORTANCE FOR IV ACCESS. PT
REMAINS RELUCANT AT TIMES. WILL NOTIFY DR BUSTILLO IF PT PT REMAINS RELUCTANT FOR
NEW IV AND REFUSES HOUSE SUPERVISOR TO PLACE NEW IV. THEODORE DRESSING FULLY
SATURATED, NO WHITE FROM THEODORE DRESSING NOTED.
NO NEW DRAINAGE/SHADOWING NOTED FROM SHIFT REPORT. DR BUSTILLO AWARE OF
SATURATION PER RN ALEJANDRO AT SHIFT REPORT.
GREEN OKAY LIGHT
FLASHING, QPUMP TO 8 MLS IN PLACE. CYRO CUFF WITH BILATERAL ANGELA HOSE AND SCD'S
IN PLACE. PT DENIES NEEDS, CALL LIGHT IN REAACH.

## 2019-02-27 NOTE — NUR
Received hand off report from night shift RN. Pt was alert and oriented.
Responding to questions appropriately.  in room with pt. Pt reports 0
out of 10 pain at this time. Dressing was saturated with sanguineous fluid.
Dressing intact. Call light in reach.

## 2019-02-27 NOTE — NUR
THIS RN CALLED DR BUSTILLO REGARDING PT'S INFILTRATED IV SITE. TELEPHONE ORDER
READ BACK THAT IT'S OKAY TO LEAVE PT WITHOUT IV ACCESS. HOUSE SUPERVISOR IN
ROOM TO DISCONTINUE PT'S IV. NO ADDITIONAL ORDERS FROM DR BUSTILLO.

## 2019-02-27 NOTE — NUR
PT WAS LAYINGIN BED EATING WITH HER  AT . DIDN'T SLEEP WELL, INCISION
BEGAN TO BLEED. NO P.T. UNTIL STOPPED. PT WAS SOMEWHAT DISCOURAGED, BUT DOING
OK. EXTENDED A BLESSING, WILL CONTINUE TO FOLLOW

## 2019-02-27 NOTE — NUR
PATIENT RESTED WELL THROUGHOUT THE SHIFT. PATIENT IS ON AN ADA DIET,
TOLERATING IT WELL, AND NO COMPLAINTS OF NAUSEA. PATIENT HAS FOOT SCDS AND
TEDHOSE APPLIED TO BILAT LOW EXT. PATIENT IS SL AND IV FLUSHES WELL. PATIENT
RECEIVED X1 DOSE OF TRASEMIC ACID. PATIENTS DRESSING CHANGED PER DR BUSTILLO
ORDER. PATIENTS THEODORE IS 85% SATURATED, GREEN LIGHT FLASHING, AND INT
VIBRATING. PATIENT HAS ON-Q IN PLACE DIAL SET TO 8. PATIENT HAS BEEN ON
BEDREST W/BSC COMMODE PRIVILEGES. PATIENT HAS CRYO APPLIED TO LEFT KNEE.
PATIENT IS A 2 PA W/FWW. PATIENT HAS SCOPE PATCH BEHIND RIGHT EAR. PATIENT IS
AAOX3. PATIENT IS AAOX3 AND USES CALL LIGHT APPROPRIATELY.

## 2019-02-27 NOTE — NUR
STUDENT NURSE IN ROOM TO TAKE VITALS AND INTAKE AND OUTPUT. THIS CNA ASSISTED
PATIENT WITH A BED BATH. LINENS CHANGED AS NEEDED. CALL LIGHT IN REACH. CRYO
STILL FULL WITH ICE, NO OTHER NEEDS AT THIS TIME.

## 2019-02-28 NOTE — NUR
pt had a good night. vss, pt on ra. a/ox4, pain well controlled with prn pain
medications. kimberlee dressing in place, green okay light flashing, qpump set to
8mls/hr. no new shadowing on kimberlee dressing, ace wrap in place. pt 1-2pa w/
ambulation. ada diet, bs wnl this shift. pt voiding qs, no bm this shift. pt
denied nausea this shift. bilateral scd's and lisa hose in place. no iv access,
md aware. pt uses call light approperiately.

## 2019-02-28 NOTE — NUR
PATIENT
CALLS TO HAVE A BEDBATH. BEDBATH DONE. PATIENT DRESS. PATIENT WALKS TO
USE BATHROOM USING A WALKER. ONE PERSON ASSISTING. PATIENT BACKS TO CHAIR.
CALL LIGHT WITHIN REACH. NO OTHER NEEDS AT THIS TIME

## 2019-02-28 NOTE — NUR
PT RESTING IN SEMI FOWLERS POSITION IN BED, ALERT AND ORIENTED WATCHING TV.
BEDSIDE REPORT RECEIVED FROM RAINER PATTON. THEODORE DSG APPEARS TO BE SATURATED
100%. THEODORE PUMP DOES NOT FLASH GREEN AND WHEN BATTERIES REPLACED STILL NO
GREEN FLASH. DR BUSTILLO NOTIFIED AND VERBAL ORDER RECEIVED TO CHANGE THEODORE
DRESSING AND PUMP. CRYO CUFF IN PLACE TO LEFT KNEE. DISTALL CMS INTACT TO LLE.
CALL LIGHT AND H20 IN REACH. PT STATES SHE IS COMFORTABLE AND DENIES
NEEDS/CONCERNS.

## 2019-02-28 NOTE — NUR
PT RESTING IN SEMI FOWLERS POSITION, DENIES PAIN, ASSESSMENT COMPLETED. DSG
SATURATED AND PUMP NOT FLASHING GREEN. DSG CHANGED WITH NEW THEODORE DSG AND PUMP,
ERROR MESSAGE FLASHING, DSG FURHTER REENFORCED WITH OPSITE AND APPEARS TO HAVE
A GOOD SEAL, ERROR LIGHT PERSISTS. RADHA, CHARGE RN NOTIFIED AND AGREES TO
REDRESS WITH NEW THEODORE AND PUMP. CALL LIGHT AND H20 IN REACH AND NO
NEEDS/CONCERNS VOICED.

## 2019-02-28 NOTE — NUR
ASSESSMENT COMPLETE, NO NEW CONCERNS. PT A/OX4, PAIN WELL CONTROLLED WITH PRN
PAIN MEDICATIONS. THEODORE DRESSING INTACT, NO NEW SHADOWING/DRAINAGE. GREEN OKAY
LIGHT FLASHING, QPUMP SET TO 8MLS/HR. BILATERAL SCD'S AND ANGELA HOSE IN PLACE.
ACE WRAP TO LEFT KNEE CDI. PT DENIES ADDITIONAL NEEDS, CALL LIGHT IN REACH.

## 2019-02-28 NOTE — NUR
THEODORE DRESSING CHANGED AND NOT SEALING PROPERLY. DRESSING REMOVED, SURGICAL
TAPE TO INCISION BEGINNING TO PEEL OFF, DR. BUSTILLO CALLED, ORDER TO REINFORCE
TAPE WITH ADDITIONAL MEDICAL SUPERGLUE AND TO ENSURE THE MEPILEX PORTION OF
THE DRESSING IS NOT TOUCHING THE SURGICAL TAPE. DRESSING CHANGED, SEAL AND
SUCTION ACHIEVED, LEG WRAPPED WITH ACE. ANGELA HOSE, CRYOCUFF AND SCDS IN PLACE.
PT DENIES OTHER NEEDS AT THIS TIME.

## 2019-02-28 NOTE — NUR
PT RESTING IN BED, RR EVEN AND UNLABORED. PT ON RA, O2 SAT 94%, HR 84. PT
APPEARS COMFORTABLE, NO SIGNS OF RESPIRATORY DISTRESS. CALL LIGHT IN REACH.

## 2019-03-01 NOTE — DS
[facility]
                                    [address]
                                  [place], [place]  [address]
_________________________________________________________________________________________
                                                                 Signed   
 
 
ADMISSION DATE:  02/25/2019
 
DISCHARGE DATE:  02/28/2019
 
ADMISSION DIAGNOSIS:
Degenerative joint disease, left knee.
 
DISCHARGE DIAGNOSIS:
Degenerative joint disease, left knee.
 
PROCEDURE PERFORMED:
Left total knee arthroplasty.
 
BRIEF HISTORY:
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  She had
undergone successful right total knee, although she developed postoperative infection.
The left knee was optimized and she wished to proceed with surgery even given the
complications and risks.  Once consent was obtained, she was taken to the operating
room.  After adequate anesthesia, she underwent the above-named procedure.  She
tolerated this well, was taken to the recovery room, and subsequently to the orthopedic
floor.  She was initially placed on oral pain medication of oxycodone and gabapentin
with excellent control of her pain.  She developed quite a bit of drainage from her
wound and the THEODORE wound dressing was changed and the flexion was stopped.  The drainage
pretty much stopped by the day of discharge.  Physical therapy when okay, however she is
still not good for gait balance and walking to go home.  She wishes to be discharged to
swing bed status in [place].  There she can continue with her inpatient rehab until she
gets strong or better balance.  She will be continued on her current pain medication and
her regular medications.  She will follow up with me in 10 to 14 days. 
 
 
 
            ________________________________________
            MD JANE Billings/GLEN
Job #:  [id number]
DD:  02/28/2019 07:52:36
DT:  03/01/2019 02:44:07
 
 
Copies:                                
 
    Electronically Signed By: VALDEMAR BUSTILLO MD  03/01/19 0713
_________________________________________________________________________________________
PATIENT NAME:     YNES BLUM                 
MEDICAL RECORD #: [id number]            DISCHARGE SUMMARY             
          ACCT #: [id number]  
DATE OF BIRTH:   [birth year]            REPORT #: [id number]      
PHYSICIAN:        VALDEMAR BUSTILLO MD              
PCP:              NOEMY BARRERA MD         
REPORT IS CONFIDENTIAL AND NOT TO BE RELEASED WITHOUT AUTHORIZATION
 
 
                                  [address] [place], [place]  [address]
_________________________________________________________________________________________
                                                                 Signed   
 
 
~
 
 
 
 
 
 
 
 
 
 
 
 
 
 
 
 
 
 
 
 
 
 
 
 
 
 
 
 
 
 
 
 
 
 
 
 
 
 
 
 
 
 
    Electronically Signed By: VALDEMAR BUSTILLO MD  03/01/19 0713
_________________________________________________________________________________________
PATIENT NAME:     YNES BLUM                 
MEDICAL RECORD #: [id number]            DISCHARGE SUMMARY             
          ACCT #: [id number]  
DATE OF BIRTH:   [birth year]            REPORT #: [id number]      
PHYSICIAN:        VALDEMAR BUSTILLO MD              
PCP:              NOEMY BARRERA MD         
REPORT IS CONFIDENTIAL AND NOT TO BE RELEASED WITHOUT AUTHORIZATION